# Patient Record
Sex: FEMALE | Race: AMERICAN INDIAN OR ALASKA NATIVE
[De-identification: names, ages, dates, MRNs, and addresses within clinical notes are randomized per-mention and may not be internally consistent; named-entity substitution may affect disease eponyms.]

---

## 2018-04-17 NOTE — CARD
--------------- APPROVED REPORT --------------





EKG Measurement

Heart [id number]

WV 182P62

[id number]

[id number]

[id number]



<Conclusion>

Normal sinus rhythm

Normal ECG

## 2018-04-17 NOTE — RAD
PROCEDURE:  Fluoroscopy up to 1 hr.



HISTORY:

LASER LITHOTRIPSY / STENT INSERTION  (LEFT)



COMPARISON:

None



TECHNIQUE:

Standard protocol for this study/examination.



FINDINGS:

 Total fluoroscopic time (continuous mode) utilized during the 

procedure 380.5 (seconds).  Total exam DLP: (mGy) 3.24 



IMPRESSION:

Less than 1 hr fluoroscopic time utilized during performance of the 

procedure.

## 2018-04-17 NOTE — CARD
--------------- APPROVED REPORT --------------





EKG Measurement

Heart [id number]

MD 192P51

[id number]

[id number]

[id number]



<Conclusion>

Normal sinus rhythm

Prolonged QT

Abnormal ECG

## 2018-04-17 NOTE — CP.PCM.CON
<Harmony Edwards - Last Filed: 04/17/18 19:40>





History of Present Illness





- History of Present Illness


History of Present Illness: 





PGY-2 House Doc for Dr. Eugene





Medical Consult: weakness, vomiting





Ms Rob, 58 F with PMHx HTN/HLD, R breast cancer in remission, comes in for 

elective surgery with Dr. Lawrence due to L hydronephrosis due to a very large 

calculus at mid left ureter with chronic obstruction. Today she underwent 

ureteroscopy with holmium laser lithrotripsy and cystoscopy with pigtail stent 

L. In PACU, pt ate muffin and drank 1 cup of coffee. She was seen ambulating in 

PACU and going to bathroom twice. RN found pt kneeling on the floor with 

clunched fists on her chest. Pt was assisted to the stretcher. Pt did not lose 

consciousness. Pt felt generalized weakness due to the pain and she had to 

gesture with clunched fists for the severe pain in L side of her back. Rapid 

response was called at 17:40 for clunched fists to r/o seizure 





ROS: (+) nausea/vomit (+) pain urination (+) frequency in urination, (+) L back 

pain 


Denies HA, dizziness, CP, SOB, abdominal pain, constipation, diarrhea, joint 

pain





PMH: HTN/HLD, anxiety, R breast cancer 2016 s/p R mastectomy/radiation/chemo, 

gout





PSH: port-a-cath; mastectomy





SH: Denies drink/smoke/drug





All: NKDA





Med: called [facility] pharm to confirm. See MAR





PMD: No


Outpatient oncologist: Dr Barone





Past Patient History





- Past Social History


Smoking Status: Never Smoked





- CARDIAC


Hx Pacemaker: No





- NEUROLOGICAL


Hx Paralysis: No





- HEMATOLOGICAL/ONCOLOGICAL


Hx Blood Transfusions: No





- MUSCULOSKELETAL/RHEUMATOLOGICAL


Hx Musculoskeletal Disorders: No





- PSYCHIATRIC


Hx Emotional Abuse: No


Hx Physical Abuse: No


Hx Substance Use: No





- SURGICAL HISTORY


Hx Surgeries: Yes





- ANESTHESIA


Hx Anesthesia Reactions: No


Hx Malignant Hyperthermia: No





Meds


Allergies/Adverse Reactions: 


 Allergies











Allergy/AdvReac Type Severity Reaction Status Date / Time


 


No Known Allergies Allergy   Verified 03/24/16 08:52














- Medications


Medications: 


 Current Medications





Alprazolam (Xanax)  0.25 mg PO BID [facility]


   PRN Reason: Protocol


   Stop: 04/24/18 18:31


Amlodipine Besylate (Norvasc)  5 mg PO QAM Counts include 234 beds at the [facility]


Levofloxacin/Dextrose (Levaquin 500mg)  500 mg in 100 mls @ 100 mls/hr IVPB 

DAILY [facility]


   PRN Reason: Protocol


   Stop: 04/23/18 10:01


Morphine Sulfate (Morphine)  1 mg IVP Q3 PRN


   PRN Reason: Pain, moderate (4-7)


Non-Formulary Medication (Simvastatin [Zocor])  40 mg PO HS Counts include 234 beds at the [facility]


Ondansetron HCl (Zofran Inj)  4 mg IVP Q6H PRN


   PRN Reason: Nausea/Vomiting


   Last Admin: 04/17/18 18:00 Dose:  4 mg


Oxycodone/Acetaminophen (Percocet 5/325 Mg Tab)  1 tab PO Q6H PRN


   PRN Reason: Bladder Spasm


   Stop: 04/20/18 14:18











Physical Exam





- Constitutional


Appears: No Acute Distress





- Head Exam


Head Exam: ATRAUMATIC, NORMAL INSPECTION, NORMOCEPHALIC





- Eye Exam


Eye Exam: EOMI, Normal appearance, PERRL.  absent: Scleral icterus


Pupil Exam: NORMAL ACCOMODATION





- ENT Exam


ENT Exam: Mucous Membranes Moist





- Neck Exam


Additional comments: 





supple





- Respiratory Exam


Respiratory Exam: Clear to Auscultation Bilateral, NORMAL BREATHING PATTERN.  

absent: Rales, Rhonchi, Wheezes





- Cardiovascular Exam


Cardiovascular Exam: REGULAR RHYTHM, +S1, +S2





- GI/Abdominal Exam


GI & Abdominal Exam: Normal Bowel Sounds, Soft.  absent: Distended, Firm, 

Guarding





- Extremities Exam


Extremities exam: Positive for: pedal pulses present.  Negative for: calf 

tenderness





- Back Exam


Additional comments: 





L back pain





- Neurological Exam


Neurological exam: Alert, CN II-XII Intact, Oriented x3


Additional comments: 





Normal speech


Move all extremities equally


No sensory deficit





- Psychiatric Exam


Psychiatric exam: Normal Affect, Normal Mood





- Skin


Skin Exam: Dry, Warm





Results





- Vital Signs


Recent Vital Signs: 


 Last Vital Signs











Temp  98.3 F   04/17/18 17:15


 


Pulse  108 H  04/17/18 17:15


 


Resp  18   04/17/18 17:15


 


BP  119/61   04/17/18 17:15


 


Pulse Ox  97   04/17/18 17:15














- Labs


Result Diagrams: 


 04/17/18 18:17





 04/17/18 18:17


Labs: 


 Laboratory Results - last 24 hr











  04/17/18





  17:44


 


POC Glucose (mg/dL)  136 H














Assessment & Plan





- Assessment and Plan (Free Text)


Plan: 





A:


Intractable nausea and vomiting likely side effect of anesthesia 


L back pain likely due to bladder spasm secondary to recent ureteroscopy with 

holmium laser lithrotripsy and cystoscopy with L pigtail stent


Dysuria likely UTI


Hx Anxiety


Hx HTN/HLD





P:





Zofran PRN; NS@100


EKG: NSR 99 with QTc 464


Pyridium x 1; Percocet 1q6 for bladder spasm, Morphine 1q3 PRN for moderate pain


Levofloxacin 500 IV daily x 5 days


continue home xanax BID


amlodipine 5mg AM


Lipitor 20 HS


SCD


protonix IV x 1, protonix PO AM


Clear liquid diet


Plan to advance diet tomorrow. If tolerating diet, plan to d/c tomorrow





s/r/d/w Dr. Eugene








<Marta Eugene - Last Filed: 04/18/18 16:53>





Meds





- Medications


Medications: 


 Current Medications





Alprazolam (Xanax)  0.25 mg PO BID Counts include 234 beds at the [facility]


   PRN Reason: Protocol


   Stop: 04/24/18 18:31


   Last Admin: 04/18/18 09:33 Dose:  Not Given


Amlodipine Besylate (Norvasc)  5 mg PO QAM Counts include 234 beds at the [facility]


   Last Admin: 04/18/18 09:29 Dose:  5 mg


Atorvastatin Calcium (Lipitor)  20 mg PO HS Counts include 234 beds at the [facility]


   Last Admin: 04/17/18 21:32 Dose:  20 mg


Enoxaparin Sodium (Lovenox)  40 mg SC DAILY Counts include 234 beds at the [facility]


   PRN Reason: Protocol


   Last Admin: 04/18/18 09:29 Dose:  40 mg


Levofloxacin/Dextrose (Levaquin 500mg)  500 mg in 100 mls @ 100 mls/hr IVPB 

DAILY [facility]


   PRN Reason: Protocol


   Stop: 04/23/18 10:01


   Last Admin: 04/18/18 09:29 Dose:  100 mls/hr


Sodium Chloride (Sodium Chloride 0.9%)  1,000 mls @ 100 mls/hr IV .Q10H [facility]


Morphine Sulfate (Morphine)  1 mg IVP Q3 PRN


   PRN Reason: Pain, moderate (4-7)


Ondansetron HCl (Zofran Inj)  4 mg IVP Q6H PRN


   PRN Reason: Nausea/Vomiting


   Last Admin: 04/17/18 18:00 Dose:  4 mg


Oxycodone/Acetaminophen (Percocet 5/325 Mg Tab)  1 tab PO Q6H PRN


   PRN Reason: Bladder Spasm


   Stop: 04/20/18 14:18


   Last Admin: 04/18/18 04:03 Dose:  1 tab


Pantoprazole Sodium (Protonix Ec Tab)  40 mg PO 0600 [facility]


   Last Admin: 04/18/18 06:04 Dose:  40 mg











Results





- Vital Signs


Recent Vital Signs: 


 Last Vital Signs











Temp  98.7 F   04/18/18 14:00


 


Pulse  112 H  04/18/18 14:00


 


Resp  18   04/18/18 14:00


 


BP  122/77   04/18/18 14:00


 


Pulse Ox  98   04/18/18 14:00














- Labs


Result Diagrams: 


 04/18/18 06:45





 04/18/18 06:45


Labs: 


 Laboratory Results - last 24 hr











  04/17/18 04/17/18 04/17/18





  17:44 18:17 18:17


 


WBC   12.1 H D 


 


RBC   4.12 


 


Hgb   11.9 L 


 


Hct   35.7 L 


 


MCV   86.7 


 


MCH   28.9 


 


MCHC   33.3 


 


RDW   13.7 


 


Plt Count   229 


 


MPV   9.8 


 


Gran %   


 


Lymph % (Auto)   


 


Mono % (Auto)   


 


Eos % (Auto)   


 


Baso % (Auto)   


 


Gran #   


 


Lymph # (Auto)   


 


Mono # (Auto)   


 


Eos # (Auto)   


 


Baso # (Auto)   


 


Sodium    140


 


Potassium    3.3 L


 


Chloride    101


 


Carbon Dioxide    27


 


Anion Gap    16


 


BUN    13


 


Creatinine    0.9


 


Est GFR ( Amer)    > 60


 


Est GFR (Non-Af Amer)    > 60


 


POC Glucose (mg/dL)  136 H  


 


Random Glucose    150 H


 


Calcium    8.7


 


Phosphorus    3.3


 


Magnesium    1.6 L


 


Total Bilirubin    0.4


 


AST    27


 


ALT    28


 


Alkaline Phosphatase    65


 


Total Protein    7.0


 


Albumin    4.1


 


Globulin    2.9


 


Albumin/Globulin Ratio    1.4














  04/18/18 04/18/18





  06:45 06:45


 


WBC  14.3 H 


 


RBC  4.21 


 


Hgb  12.0 


 


Hct  36.3 


 


MCV  86.2 


 


MCH  28.5 


 


MCHC  33.1 


 


RDW  13.9 


 


Plt Count  212 


 


MPV  9.1 


 


Gran %  88.8 H 


 


Lymph % (Auto)  6.8 L 


 


Mono % (Auto)  4.3 


 


Eos % (Auto)  0.0 L 


 


Baso % (Auto)  0.1 


 


Gran #  12.69 H 


 


Lymph # (Auto)  1.0 L 


 


Mono # (Auto)  0.6 


 


Eos # (Auto)  0.0 


 


Baso # (Auto)  0.01 


 


Sodium   138


 


Potassium   3.5 L


 


Chloride   99


 


Carbon Dioxide   27


 


Anion Gap   15


 


BUN   18


 


Creatinine   0.9


 


Est GFR ( Amer)   > 60


 


Est GFR (Non-Af Amer)   > 60


 


POC Glucose (mg/dL)  


 


Random Glucose   152 H


 


Calcium   8.6


 


Phosphorus  


 


Magnesium  


 


Total Bilirubin   0.6


 


AST   27


 


ALT   27


 


Alkaline Phosphatase   51


 


Total Protein   7.6


 


Albumin   4.5


 


Globulin   3.1


 


Albumin/Globulin Ratio   1.4














Attending/Attestation





- Attestation


I have personally seen and examined this patient.: Yes


I have fully participated in the care of the patient.: Yes


I have reviewed all pertinent clinical information: Yes


Notes (Text): 





04/18/18 16:47








Medical record note made by the resident after discussion with my direction and 

input after the patient was personally seen and examined by me. I have reviewed 

the chart and agree that the record accurately reflects by personal performance 

of the history, physical exam, data review, and medical decision-making, in the 

course for the patient. I have also personally directed the plan of care.





58  yrs old female  with PMHx HTN/HLD, R breast cancer in remission,

Nephrolithiasis  comes in for elective surgery with Dr. Lawrence due to L 

hydronephrosis due to a very large calculus at mid left ureter with chronic 

obstruction.Patient is SP  ureteroscopy with holmium laser lithrotripsy and 

cystoscopy with pigtail stent L. In PACU, pt ate muffin and drank 1 cup of 

coffee. She was seen ambulating in PACU and going to bathroom twice. RN found 

pt kneeling on the floor with clunched fists on her.Patient did not has any 

fall.There is H/O lose of consiousness.There is no h/o incontinence of urine or 

stool or tongue bite.Patient was back to her base line in 1-2 minutes.She is 

aware of all the events and tols that she was clnching as she was having 

pain.Patient Neuro examination is normal.There is no evidence of 

seizure.Patient is admitted for observation by Urology.


Continue supportive treatment for Nausea and vomiting.


Continue home anti hypertensive medication.





We will follow up patient with Urology service

## 2018-04-18 NOTE — RAD
HISTORY:

Urolithiasis.



COMPARISON:

Correlation made with prior PET-CT scan 04/03/2018



FINDINGS:

Interval placement left ureteral stent. Elliptical shaped 

calcification measuring approximately 16 mm x 11 mm adjacent to the 

superolateral border of the proximal pigtail - coil component of the 

ureteral stent. . .  This calculus likely represented calculus that 

was located in the proximal left ureter (seen on prior PET-CT scan) 

which has been pushed back into the left renal collecting system 

during placement of the ureteral stent. 



BOWEL:

Nonspecific bowel gas pattern.



BONES:

Normal.



OTHER FINDINGS:

None.



IMPRESSION:

In situ left ureteral stent.  Previously noted large calculus that 

was seen in the proximal left ureter presumably has been pushed back 

up into the collecting system as above.

## 2018-04-18 NOTE — CP.PCM.PN
<Tyree Gregory - Last Filed: 04/18/18 13:42>





Subjective





- Date & Time of Evaluation


Date of Evaluation: 04/18/18


Time of Evaluation: 13:42





- Subjective


Subjective: 





Patient seen and evaluated overnight. Patient overnight required straight cath 

for evacuation of urine. Patient voiding appropriately at this time. No 

complaints otherwise. 





Objective





- Vital Signs/Intake and Output


Vital Signs (last 24 hours): 


 











Temp Pulse Resp BP Pulse Ox


 


 98.8 F   107 H  18   131/72   97 


 


 04/18/18 09:06  04/18/18 09:06  04/18/18 09:06  04/18/18 09:29  04/18/18 09:06








Intake and Output: 


 











 04/18/18 04/18/18





 06:59 18:59


 


Intake Total 600 


 


Output Total 1300 


 


Balance -700 














- Medications


Medications: 


 Current Medications





Alprazolam (Xanax)  0.25 mg PO BID [facility]


   PRN Reason: Protocol


   Stop: 04/24/18 18:31


   Last Admin: 04/18/18 09:33 Dose:  Not Given


Amlodipine Besylate (Norvasc)  5 mg PO QAM [facility]


   Last Admin: 04/18/18 09:29 Dose:  5 mg


Atorvastatin Calcium (Lipitor)  20 mg PO HS [facility]


   Last Admin: 04/17/18 21:32 Dose:  20 mg


Enoxaparin Sodium (Lovenox)  40 mg SC DAILY [facility]


   PRN Reason: Protocol


   Last Admin: 04/18/18 09:29 Dose:  40 mg


Levofloxacin/Dextrose (Levaquin 500mg)  500 mg in 100 mls @ 100 mls/hr IVPB 

DAILY [facility]


   PRN Reason: Protocol


   Stop: 04/23/18 10:01


   Last Admin: 04/18/18 09:29 Dose:  100 mls/hr


Sodium Chloride (Sodium Chloride 0.9%)  1,000 mls @ 100 mls/hr IV .Q10H [facility]


Morphine Sulfate (Morphine)  1 mg IVP Q3 PRN


   PRN Reason: Pain, moderate (4-7)


Ondansetron HCl (Zofran Inj)  4 mg IVP Q6H PRN


   PRN Reason: Nausea/Vomiting


   Last Admin: 04/17/18 18:00 Dose:  4 mg


Oxycodone/Acetaminophen (Percocet 5/325 Mg Tab)  1 tab PO Q6H PRN


   PRN Reason: Bladder Spasm


   Stop: 04/20/18 14:18


   Last Admin: 04/18/18 04:03 Dose:  1 tab


Pantoprazole Sodium (Protonix Ec Tab)  40 mg PO 0600 [facility]


   Last Admin: 04/18/18 06:04 Dose:  40 mg











- Labs


Labs: 


 





 04/18/18 06:45 





 04/18/18 06:45 











- Head Exam


Head Exam: ATRAUMATIC, NORMAL INSPECTION, NORMOCEPHALIC





- Eye Exam


Eye Exam: EOMI, PERRL





- Respiratory Exam


Respiratory Exam: Clear to Ausculation Bilateral, NORMAL BREATHING PATTERN.  

absent: Wheezes





- Cardiovascular Exam


Cardiovascular Exam: REGULAR RHYTHM, +S1, +S2





- GI/Abdominal Exam


GI & Abdominal Exam: Soft, Tenderness (mild left lower quadrant ), Normal Bowel 

Sounds





- Back Exam


Back Exam: NORMAL INSPECTION.  absent: paraspinal tenderness





- Neurological Exam


Neurological Exam: Alert, Awake, Oriented x3





- Psychiatric Exam


Psychiatric exam: Normal Affect, Normal Mood





- Skin


Skin Exam: Dry, Warm





Assessment and Plan





- Assessment and Plan (Free Text)


Assessment: 





Patient is a 58 year old female who was admitted for intractable nausea and 

vomiting likely side effect of anesthesia L back pain likely due to bladder 

spasm secondary to recent ureteroscopy with holmium laser lithrotripsy and 

cystoscopy with L pigtail stent. Patient admitted by Dr. Lawrence. 


Plan: 





Bladder spasm s/p urethral stenting with Dr. Lawrence


- Levofloxacin


- Pyridium


- Percocet 1q6H


- Monitor clinically 





HLD


- Lipitor 20mg





HTN


- Monitor clinically


- Stable


- Amlodipine





DVT: Lovenox


GI: protonix








Dispo: Patient is medically cleared for outpatient follow up, please reach out 

for further questions





Case and plan discussed with attending





<Marta Eugene - Last Filed: 04/18/18 16:53>





Objective





- Vital Signs/Intake and Output


Vital Signs (last 24 hours): 


 











Temp Pulse Resp BP Pulse Ox


 


 98.7 F   112 H  18   122/77   98 


 


 04/18/18 14:00  04/18/18 14:00  04/18/18 14:00  04/18/18 14:00  04/18/18 14:00








Intake and Output: 


 











 04/18/18 04/18/18





 06:59 18:59


 


Intake Total 600 8840


 


Output Total 1300 300


 


Balance -700 8540














- Medications


Medications: 


 Current Medications





Alprazolam (Xanax)  0.25 mg PO BID [facility]


   PRN Reason: Protocol


   Stop: 04/24/18 18:31


   Last Admin: 04/18/18 09:33 Dose:  Not Given


Amlodipine Besylate (Norvasc)  5 mg PO QAM [facility]


   Last Admin: 04/18/18 09:29 Dose:  5 mg


Atorvastatin Calcium (Lipitor)  20 mg PO HS [facility]


   Last Admin: 04/17/18 21:32 Dose:  20 mg


Enoxaparin Sodium (Lovenox)  40 mg SC DAILY [facility]


   PRN Reason: Protocol


   Last Admin: 04/18/18 09:29 Dose:  40 mg


Levofloxacin/Dextrose (Levaquin 500mg)  500 mg in 100 mls @ 100 mls/hr IVPB 

DAILY [facility]


   PRN Reason: Protocol


   Stop: 04/23/18 10:01


   Last Admin: 04/18/18 09:29 Dose:  100 mls/hr


Sodium Chloride (Sodium Chloride 0.9%)  1,000 mls @ 100 mls/hr IV .Q10H [facility]


Morphine Sulfate (Morphine)  1 mg IVP Q3 PRN


   PRN Reason: Pain, moderate (4-7)


Ondansetron HCl (Zofran Inj)  4 mg IVP Q6H PRN


   PRN Reason: Nausea/Vomiting


   Last Admin: 04/17/18 18:00 Dose:  4 mg


Oxycodone/Acetaminophen (Percocet 5/325 Mg Tab)  1 tab PO Q6H PRN


   PRN Reason: Bladder Spasm


   Stop: 04/20/18 14:18


   Last Admin: 04/18/18 04:03 Dose:  1 tab


Pantoprazole Sodium (Protonix Ec Tab)  40 mg PO 0600 [facility]


   Last Admin: 04/18/18 06:04 Dose:  40 mg











- Labs


Labs: 


 





 04/18/18 06:45 





 04/18/18 06:45 











Attending/Attestation





- Attestation


I have personally seen and examined this patient.: Yes


I have fully participated in the care of the patient.: Yes


I have reviewed all pertinent clinical information, including history, physical 

exam and plan: Yes


Notes (Text): 





04/18/18 16:53





Medical record note made by the resident after discussion with my direction and 

input after the patient was personally seen and examined by me. I have reviewed 

the chart and agree that the record accurately reflects by personal performance 

of the history, physical exam, data review, and medical decision-making, in the 

course for the patient. I have also personally directed the plan of care.

## 2018-04-18 NOTE — CP.PCM.CON
History of Present Illness





- History of Present Illness


History of Present Illness: 


PGY-2 consult note for Dr. Barone





58 F with PMH of HTN, hyperlipidemia, Right breast cancer in remission, comes 

in for elective surgery with Dr. Lawrence due to L hydronephrosis due to a very 

large calculus at mid left ureter with chronic obstruction. She underwent 

ureteroscopy with holmium laser lithrotripsy and cystoscopy with pigtail stent. 

In PACU, She was seen ambulating in PACU and going to bathroom twice. RN found 

pt kneeling on the floor with clutched fists on her chest. Pt was assisted to 

the stretcher. Pt felt generalized weakness due to the pain and she had to 

gesture with clutched fists for the severe pain in L side of her back. Rapid 

response was called. Patients states that she was having pain 10/10 greater on 

the left side. She states that she felt that she had to urinate but could not. 

Overnight patient had garner placed and drained about 1500cc. This morning 

patient states that ernesto pain is much better and she is able to make urine 

however she reports hematuria. She denies fever, chill, chest pain, sob, n/v 

abd pain, headache, dizziness.





PMH: HTN, hyperlipidemia, anxiety, R breast cancer 2016 s/p R mastectomy/

radiation/chemo, gout


PSH: port-a-cath; mastectomy


Social History : Denies alcohol use, smoke, illict drug use


Allergy: NKDA














Review of Systems





- Review of Systems


All systems: reviewed and no additional remarkable complaints except





Past Patient History





- Past Social History


Smoking Status: Never Smoked





- CARDIAC


Hx Cardiac Disorders: No





- PULMONARY


Hx Respiratory Disorders: No





- NEUROLOGICAL


Hx Neurological Disorder: No





- HEENT


Hx HEENT Problems: No





- RENAL


Hx Kidney Stones: Yes





- ENDOCRINE/METABOLIC


Hx Endocrine Disorders: No





- HEMATOLOGICAL/ONCOLOGICAL


Hx Blood Disorders: No





- INTEGUMENTARY


Hx Dermatological Problems: No





- MUSCULOSKELETAL/RHEUMATOLOGICAL


Hx Musculoskeletal Disorders: No


Hx Falls: No





- GASTROINTESTINAL


Hx Gastrointestinal Disorders: No





- GENITOURINARY/GYNECOLOGICAL


Hx Genitourinary Disorders: Yes


Hx Urinary Tract Infection: Yes





- PSYCHIATRIC


Hx Psychophysiologic Disorder: No


Hx Substance Use: No





- SURGICAL HISTORY


Hx Surgeries: Yes


Hx Mastectomy: Yes





- ANESTHESIA


Hx Anesthesia Reactions: No


Hx Malignant Hyperthermia: No





Meds


Allergies/Adverse Reactions: 


 Allergies











Allergy/AdvReac Type Severity Reaction Status Date / Time


 


No Known Allergies Allergy   Verified 03/24/16 08:52














- Medications


Medications: 


 Current Medications





Alprazolam (Xanax)  0.25 mg PO BID [facility]


   PRN Reason: Protocol


   Stop: 04/24/18 18:31


   Last Admin: 04/18/18 09:33 Dose:  Not Given


Amlodipine Besylate (Norvasc)  5 mg PO QAM [facility]


   Last Admin: 04/18/18 09:29 Dose:  5 mg


Atorvastatin Calcium (Lipitor)  20 mg PO HS [facility]


   Last Admin: 04/17/18 21:32 Dose:  20 mg


Enoxaparin Sodium (Lovenox)  40 mg SC DAILY [facility]


   PRN Reason: Protocol


   Last Admin: 04/18/18 09:29 Dose:  40 mg


Levofloxacin/Dextrose (Levaquin 500mg)  500 mg in 100 mls @ 100 mls/hr IVPB 

DAILY [facility]


   PRN Reason: Protocol


   Stop: 04/23/18 10:01


   Last Admin: 04/18/18 09:29 Dose:  100 mls/hr


Sodium Chloride (Sodium Chloride 0.9%)  1,000 mls @ 100 mls/hr IV .Q10H [facility]


Morphine Sulfate (Morphine)  1 mg IVP Q3 PRN


   PRN Reason: Pain, moderate (4-7)


Ondansetron HCl (Zofran Inj)  4 mg IVP Q6H PRN


   PRN Reason: Nausea/Vomiting


   Last Admin: 04/17/18 18:00 Dose:  4 mg


Oxycodone/Acetaminophen (Percocet 5/325 Mg Tab)  1 tab PO Q6H PRN


   PRN Reason: Bladder Spasm


   Stop: 04/20/18 14:18


   Last Admin: 04/18/18 04:03 Dose:  1 tab


Pantoprazole Sodium (Protonix Ec Tab)  40 mg PO 0600 [facility]


   Last Admin: 04/18/18 06:04 Dose:  40 mg











Physical Exam





- Constitutional


Appears: No Acute Distress





- Head Exam


Head Exam: ATRAUMATIC, NORMOCEPHALIC





- Eye Exam


Eye Exam: Normal appearance





- ENT Exam


ENT Exam: Mucous Membranes Moist





- Respiratory Exam


Respiratory Exam: Clear to Auscultation Bilateral, NORMAL BREATHING PATTERN.  

absent: Rhonchi, Wheezes, Respiratory Distress





- Cardiovascular Exam


Cardiovascular Exam: REGULAR RHYTHM, +S1, +S2.  absent: Bradycardia, Tachycardia

, Diastolic murmur, Systolic Murmur





- GI/Abdominal Exam


GI & Abdominal Exam: Normal Bowel Sounds, Soft, Tenderness (L>R).  absent: 

Distended, Firm





- Extremities Exam


Extremities exam: Positive for: normal inspection.  Negative for: pedal edema, 

tenderness





- Neurological Exam


Neurological exam: Alert, Oriented x3





- Psychiatric Exam


Psychiatric exam: Normal Affect, Normal Mood





- Skin


Skin Exam: Dry, Intact, Normal Color, Warm





Results





- Vital Signs


Recent Vital Signs: 


 Last Vital Signs











Temp  98.8 F   04/18/18 09:06


 


Pulse  107 H  04/18/18 09:06


 


Resp  18   04/18/18 09:06


 


BP  131/72   04/18/18 09:29


 


Pulse Ox  97   04/18/18 09:06














- Labs


Result Diagrams: 


 04/18/18 06:45





 04/18/18 06:45


Labs: 


 Laboratory Results - last 24 hr











  04/17/18 04/17/18 04/17/18





  17:44 18:17 18:17


 


WBC   12.1 H D 


 


RBC   4.12 


 


Hgb   11.9 L 


 


Hct   35.7 L 


 


MCV   86.7 


 


MCH   28.9 


 


MCHC   33.3 


 


RDW   13.7 


 


Plt Count   229 


 


MPV   9.8 


 


Gran %   


 


Lymph % (Auto)   


 


Mono % (Auto)   


 


Eos % (Auto)   


 


Baso % (Auto)   


 


Gran #   


 


Lymph # (Auto)   


 


Mono # (Auto)   


 


Eos # (Auto)   


 


Baso # (Auto)   


 


Sodium    140


 


Potassium    3.3 L


 


Chloride    101


 


Carbon Dioxide    27


 


Anion Gap    16


 


BUN    13


 


Creatinine    0.9


 


Est GFR ( Amer)    > 60


 


Est GFR (Non-Af Amer)    > 60


 


POC Glucose (mg/dL)  136 H  


 


Random Glucose    150 H


 


Calcium    8.7


 


Phosphorus    3.3


 


Magnesium    1.6 L


 


Total Bilirubin    0.4


 


AST    27


 


ALT    28


 


Alkaline Phosphatase    65


 


Total Protein    7.0


 


Albumin    4.1


 


Globulin    2.9


 


Albumin/Globulin Ratio    1.4














  04/18/18 04/18/18





  06:45 06:45


 


WBC  14.3 H 


 


RBC  4.21 


 


Hgb  12.0 


 


Hct  36.3 


 


MCV  86.2 


 


MCH  28.5 


 


MCHC  33.1 


 


RDW  13.9 


 


Plt Count  212 


 


MPV  9.1 


 


Gran %  88.8 H 


 


Lymph % (Auto)  6.8 L 


 


Mono % (Auto)  4.3 


 


Eos % (Auto)  0.0 L 


 


Baso % (Auto)  0.1 


 


Gran #  12.69 H 


 


Lymph # (Auto)  1.0 L 


 


Mono # (Auto)  0.6 


 


Eos # (Auto)  0.0 


 


Baso # (Auto)  0.01 


 


Sodium   138


 


Potassium   3.5 L


 


Chloride   99


 


Carbon Dioxide   27


 


Anion Gap   15


 


BUN   18


 


Creatinine   0.9


 


Est GFR ( Amer)   > 60


 


Est GFR (Non-Af Amer)   > 60


 


POC Glucose (mg/dL)  


 


Random Glucose   152 H


 


Calcium   8.6


 


Phosphorus  


 


Magnesium  


 


Total Bilirubin   0.6


 


AST   27


 


ALT   27


 


Alkaline Phosphatase   51


 


Total Protein   7.6


 


Albumin   4.5


 


Globulin   3.1


 


Albumin/Globulin Ratio   1.4














Assessment & Plan





- Assessment and Plan (Free Text)


Assessment: 


58 F with PMH of HTN, hyperlipidemia, Right breast cancer in remission, comes 

in for elective surgery admitted s/p RRT, patient presents with  left sided 

back pain and urinary retention, currently with hematuria. 


Dysuria with hematuria


Anxiety


HTN





Plan: 





hematuria and pain possibly secondary to procedure


abd xray showed insitu Left ureteral stent


NS@100


EKG: NSR 99 with QTc 464


continue pain management


continue antibiotics per primary 


patient will need real scan once improved to evaluate kidney function


patient will also need outpatient MRI fo dorsal spine 


continue home medications xanax, amlodipine,Lipitor 





case reviewed and discussed with Dr. Barone

## 2018-04-19 NOTE — PCM.URO
Urology Progress Note





- Objective


Lab Studies: Reviewed


Lab Results Last 24 Hours: 


 Laboratory Results - last 24 hr











  04/19/18 04/19/18 04/19/18





  07:00 07:00 10:20


 


WBC  9.7  D  


 


RBC  3.53  


 


Hgb  10.1 L  


 


Hct  30.4 L  


 


MCV  86.1  


 


MCH  28.6  


 


MCHC  33.2  


 


RDW  13.9  


 


Plt Count  174  


 


MPV  9.0  


 


Gran %  76.9 H  


 


Lymph % (Auto)  14.5 L  


 


Mono % (Auto)  8.4 H  


 


Eos % (Auto)  0.0 L  


 


Baso % (Auto)  0.2  


 


Gran #  7.46 H  


 


Lymph # (Auto)  1.4  


 


Mono # (Auto)  0.8 H  


 


Eos # (Auto)  0.0  


 


Baso # (Auto)  0.02  


 


Sodium   141 


 


Potassium   3.3 L 


 


Chloride   104 


 


Carbon Dioxide   27 


 


Anion Gap   13 


 


BUN   9 


 


Creatinine   0.9 


 


Est GFR ( Amer)   > 60 


 


Est GFR (Non-Af Amer)   > 60 


 


Random Glucose   112 H 


 


Calcium   8.7 


 


Magnesium   1.8 


 


Total Bilirubin   0.5 


 


AST   24 


 


ALT   26 


 


Alkaline Phosphatase   44 


 


Total Protein   6.4 


 


Albumin   3.6 


 


Globulin   2.8 


 


Albumin/Globulin Ratio   1.3 


 


Urine Color    Red


 


Urine Appearance    Turbid


 


Urine pH    6.5


 


Ur Specific Gravity    1.010


 


Urine Protein    100 H


 


Urine Glucose (UA)    Negative


 


Urine Ketones    15 H


 


Urine Blood    Large H


 


Urine Nitrate    Negative


 


Urine Bilirubin    Negative


 


Urine Urobilinogen    0.2


 


Ur Leukocyte Esterase    Moderate H


 


Urine RBC    Tntc


 


Urine WBC    15 - 20


 


Urine Bacteria    Trace











Intake & Output: 


 Intake & Output











 04/18/18 04/19/18 04/19/18





 18:59 06:59 18:59


 


Intake Total 8840 720 780


 


Output Total 300 975 900


 


Balance 8540 -255 -120


 


Weight 126 lb  


 


Intake:   


 


  Oral 8840 720 780


 


Output:   


 


  Urine 300 975 900


 


    Urethral (Andres) 300 975 900


 


Other:   


 


  # Voids   


 


    Urethral (Andres) 5  


 


  # Bowel Movements 0 0 0











Vital Signs: 


 Vital Signs - 24 hr











  04/19/18 04/19/18 04/19/18





  06:00 14:00 15:00


 


Temperature 99.2 F 102.8 F H 99.1 F


 


Pulse Rate 106 H 137 H 


 


Respiratory 18 18 





Rate   


 


Blood Pressure 118/61 121/65 


 


O2 Sat by Pulse 96 98 





Oximetry

## 2018-04-19 NOTE — CARD
--------------- APPROVED REPORT --------------





EKG Measurement

Heart [id number]

ND [id number]

[id number]

[id number]

[id number]



<Conclusion>

Sinus tachycardia

Nonspecific T wave abnormality

Abnormal ECG

## 2018-04-19 NOTE — CP.PCM.CON
History of Present Illness





- History of Present Illness


History of Present Illness: 


Infectious Disease Consultation:


April 19, 2019





59 yo [place] female with PMHx of HTN, hyperlipidemia, right breast cancer 

requiring mastectomy and radiation therapy with one episode of recurrence also 

treated presented for large calculus at mid left ureter with chronic 

obstruction.  The patient was taken to OR by Dr. Lawrence for laser therapy to 

remove and break stone.  Majority of the stone was removed.  A smaller piece in 

the left kidney at this time.  Had urinary retention as well.  Developed fevers 

up to 102.8 F today.





Overall she is feeling better but still with tenderness of the left mid back 

and flank regions.





PMHx:


HTN, hyperlipidemia, right breast cancer requiring mastectomy and radiation 

therapy





PSHx:


Right breast mastectomy





Allergies:


NKDA





Social Hx:


No tobacco, EtOH, or illicit drug use





Active Medications





Acetaminophen (Tylenol 325mg Tab)  650 mg PO Q6H PRN


   PRN Reason: Fever >100.4 F


   Last Admin: 04/19/18 14:00 Dose:  650 mg


Alprazolam (Xanax)  0.25 mg PO BID [facility]


   PRN Reason: Protocol


   Stop: 04/24/18 18:31


   Last Admin: 04/19/18 10:02 Dose:  0.25 mg


Amlodipine Besylate (Norvasc)  5 mg PO QAM [facility]


   Last Admin: 04/19/18 10:02 Dose:  5 mg


Atorvastatin Calcium (Lipitor)  20 mg PO HS [facility]


   Last Admin: 04/18/18 21:27 Dose:  20 mg


Hyoscyamine (Levsin)  0.125 mg PO Q4H PRN


   PRN Reason: GI distress


Sodium Chloride (Sodium Chloride 0.9%)  1,000 mls @ 100 mls/hr IV .Q10H [facility]


   Last Admin: 04/19/18 10:03 Dose:  100 mls/hr


Piperacillin Sod/Tazobactam Sod (Zosyn 4.5 Gm In Ns 100ml)  4.5 gm in 100 mls @ 

200 mls/hr IVPB Q6 [facility]


   PRN Reason: Protocol


   Stop: 04/20/18 00:29


Magnesium Citrate (Citrate Of Mag)  300 ml PO ONCE ONE


   Stop: 04/19/18 18:06


Morphine Sulfate (Morphine)  1 mg IVP Q3 PRN


   PRN Reason: Pain, moderate (4-7)


   Last Admin: 04/18/18 21:27 Dose:  1 mg


Ondansetron HCl (Zofran Inj)  4 mg IVP Q6H PRN


   PRN Reason: Nausea/Vomiting


   Last Admin: 04/17/18 18:00 Dose:  4 mg


Oxycodone/Acetaminophen (Percocet 5/325 Mg Tab)  1 tab PO Q6H PRN


   PRN Reason: Bladder Spasm


   Stop: 04/20/18 14:18


   Last Admin: 04/19/18 04:03 Dose:  1 tab


Pantoprazole Sodium (Protonix Ec Tab)  40 mg PO 0600 [facility]


   Last Admin: 04/19/18 10:04 Dose:  Not Given





Family Hx:


none given





ROS:


Fevers, chills, left flank pain.


No SOB, headaches, dizziness, chest pain, melena, hematuria, hematemesis, 

hematochezia, depression, anxiety, depression.





Past Patient History





- Past Social History


Smoking Status: Never Smoked





- CARDIAC


Hx Cardiac Disorders: No





- PULMONARY


Hx Respiratory Disorders: No





- NEUROLOGICAL


Hx Neurological Disorder: No





- HEENT


Hx HEENT Problems: No





- RENAL


Hx Kidney Stones: Yes





- ENDOCRINE/METABOLIC


Hx Endocrine Disorders: No





- HEMATOLOGICAL/ONCOLOGICAL


Hx Blood Disorders: No





- INTEGUMENTARY


Hx Dermatological Problems: No





- MUSCULOSKELETAL/RHEUMATOLOGICAL


Hx Musculoskeletal Disorders: No


Hx Falls: No





- GASTROINTESTINAL


Hx Gastrointestinal Disorders: No





- GENITOURINARY/GYNECOLOGICAL


Hx Genitourinary Disorders: Yes


Hx Urinary Tract Infection: Yes





- PSYCHIATRIC


Hx Psychophysiologic Disorder: No


Hx Substance Use: No





- SURGICAL HISTORY


Hx Surgeries: Yes


Hx Mastectomy: Yes





- ANESTHESIA


Hx Anesthesia Reactions: No


Hx Malignant Hyperthermia: No





Meds


Allergies/Adverse Reactions: 


 Allergies











Allergy/AdvReac Type Severity Reaction Status Date / Time


 


No Known Allergies Allergy   Verified 03/24/16 08:52














- Medications


Medications: 


 Current Medications





Acetaminophen (Tylenol 325mg Tab)  650 mg PO Q6H PRN


   PRN Reason: Fever >100.4 F


   Last Admin: 04/19/18 14:00 Dose:  650 mg


Alprazolam (Xanax)  0.25 mg PO BID [facility]


   PRN Reason: Protocol


   Stop: 04/24/18 18:31


   Last Admin: 04/19/18 10:02 Dose:  0.25 mg


Amlodipine Besylate (Norvasc)  5 mg PO QAM [facility]


   Last Admin: 04/19/18 10:02 Dose:  5 mg


Atorvastatin Calcium (Lipitor)  20 mg PO HS [facility]


   Last Admin: 04/18/18 21:27 Dose:  20 mg


Hyoscyamine (Levsin)  0.125 mg PO Q4H PRN


   PRN Reason: GI distress


Sodium Chloride (Sodium Chloride 0.9%)  1,000 mls @ 100 mls/hr IV .Q10H [facility]


   Last Admin: 04/19/18 10:03 Dose:  100 mls/hr


Piperacillin Sod/Tazobactam Sod (Zosyn 4.5 Gm In Ns 100ml)  4.5 gm in 100 mls @ 

200 mls/hr IVPB Q6 [facility]


   PRN Reason: Protocol


   Stop: 04/20/18 00:29


Magnesium Citrate (Citrate Of Mag)  300 ml PO ONCE ONE


   Stop: 04/19/18 18:06


Morphine Sulfate (Morphine)  1 mg IVP Q3 PRN


   PRN Reason: Pain, moderate (4-7)


   Last Admin: 04/18/18 21:27 Dose:  1 mg


Ondansetron HCl (Zofran Inj)  4 mg IVP Q6H PRN


   PRN Reason: Nausea/Vomiting


   Last Admin: 04/17/18 18:00 Dose:  4 mg


Oxycodone/Acetaminophen (Percocet 5/325 Mg Tab)  1 tab PO Q6H PRN


   PRN Reason: Bladder Spasm


   Stop: 04/20/18 14:18


   Last Admin: 04/19/18 04:03 Dose:  1 tab


Pantoprazole Sodium (Protonix Ec Tab)  40 mg PO 0600 [facility]


   Last Admin: 04/19/18 10:04 Dose:  Not Given











Physical Exam





- Constitutional


Appears: Non-toxic, No Acute Distress, Chronically Ill





- Head Exam


Head Exam: ATRAUMATIC, NORMOCEPHALIC





- Eye Exam


Eye Exam: EOMI, PERRL


Pupil Exam: NORMAL ACCOMODATION, PERRL





- ENT Exam


ENT Exam: Mucous Membranes Moist, Normal External Ear Exam, TM's Normal 

Bilaterally





- Neck Exam


Neck exam: Positive for: Full Rom, Normal Inspection





- Respiratory Exam


Respiratory Exam: Clear to Auscultation Bilateral, NORMAL BREATHING PATTERN.  

absent: Rales, Rhonchi, Wheezes





- Cardiovascular Exam


Cardiovascular Exam: REGULAR RHYTHM, RRR, +S1, +S2





- GI/Abdominal Exam


Additional comments: 


left flank pain.





- Extremities Exam


Extremities exam: Positive for: full ROM, normal inspection





- Neurological Exam


Neurological exam: Alert, CN II-XII Intact, Normal Gait, Oriented x3





- Psychiatric Exam


Psychiatric exam: Normal Affect, Normal Mood





- Skin


Skin Exam: Intact, Normal Color





Results





- Vital Signs


Recent Vital Signs: 


 Last Vital Signs











Temp  99.1 F   04/19/18 15:00


 


Pulse  137 H  04/19/18 14:00


 


Resp  18   04/19/18 14:00


 


BP  121/65   04/19/18 14:00


 


Pulse Ox  98   04/19/18 14:00














- Labs


Result Diagrams: 


 04/19/18 07:00





 04/19/18 07:00


Labs: 


 Laboratory Results - last 24 hr











  04/19/18 04/19/18 04/19/18





  07:00 07:00 10:20


 


WBC  9.7  D  


 


RBC  3.53  


 


Hgb  10.1 L  


 


Hct  30.4 L  


 


MCV  86.1  


 


MCH  28.6  


 


MCHC  33.2  


 


RDW  13.9  


 


Plt Count  174  


 


MPV  9.0  


 


Gran %  76.9 H  


 


Lymph % (Auto)  14.5 L  


 


Mono % (Auto)  8.4 H  


 


Eos % (Auto)  0.0 L  


 


Baso % (Auto)  0.2  


 


Gran #  7.46 H  


 


Lymph # (Auto)  1.4  


 


Mono # (Auto)  0.8 H  


 


Eos # (Auto)  0.0  


 


Baso # (Auto)  0.02  


 


Sodium   141 


 


Potassium   3.3 L 


 


Chloride   104 


 


Carbon Dioxide   27 


 


Anion Gap   13 


 


BUN   9 


 


Creatinine   0.9 


 


Est GFR ( Amer)   > 60 


 


Est GFR (Non-Af Amer)   > 60 


 


Random Glucose   112 H 


 


Lactic Acid   


 


Calcium   8.7 


 


Magnesium   1.8 


 


Total Bilirubin   0.5 


 


AST   24 


 


ALT   26 


 


Alkaline Phosphatase   44 


 


Total Protein   6.4 


 


Albumin   3.6 


 


Globulin   2.8 


 


Albumin/Globulin Ratio   1.3 


 


Urine Color    Red


 


Urine Appearance    Turbid


 


Urine pH    6.5


 


Ur Specific Gravity    1.010


 


Urine Protein    100 H


 


Urine Glucose (UA)    Negative


 


Urine Ketones    15 H


 


Urine Blood    Large H


 


Urine Nitrate    Negative


 


Urine Bilirubin    Negative


 


Urine Urobilinogen    0.2


 


Ur Leukocyte Esterase    Moderate H


 


Urine RBC    Tntc


 


Urine WBC    15 - 20


 


Urine Bacteria    Trace














  04/19/18





  16:20


 


WBC 


 


RBC 


 


Hgb 


 


Hct 


 


MCV 


 


MCH 


 


MCHC 


 


RDW 


 


Plt Count 


 


MPV 


 


Gran % 


 


Lymph % (Auto) 


 


Mono % (Auto) 


 


Eos % (Auto) 


 


Baso % (Auto) 


 


Gran # 


 


Lymph # (Auto) 


 


Mono # (Auto) 


 


Eos # (Auto) 


 


Baso # (Auto) 


 


Sodium 


 


Potassium 


 


Chloride 


 


Carbon Dioxide 


 


Anion Gap 


 


BUN 


 


Creatinine 


 


Est GFR ( Amer) 


 


Est GFR (Non-Af Amer) 


 


Random Glucose 


 


Lactic Acid  0.7


 


Calcium 


 


Magnesium 


 


Total Bilirubin 


 


AST 


 


ALT 


 


Alkaline Phosphatase 


 


Total Protein 


 


Albumin 


 


Globulin 


 


Albumin/Globulin Ratio 


 


Urine Color 


 


Urine Appearance 


 


Urine pH 


 


Ur Specific Gravity 


 


Urine Protein 


 


Urine Glucose (UA) 


 


Urine Ketones 


 


Urine Blood 


 


Urine Nitrate 


 


Urine Bilirubin 


 


Urine Urobilinogen 


 


Ur Leukocyte Esterase 


 


Urine RBC 


 


Urine WBC 


 


Urine Bacteria 














Assessment & Plan





- Assessment and Plan (Free Text)


Assessment: 


59 yo [place] female with Breast Cancer treated with mastectomy and radiation 

therapy.  Laser therapy for very large urethral stone.  Patient with fevers up 

to 102.8 F in the past 24 hours.  Start on Cefepime for antibiotic coverage.  

Pan cultures of blood and urine.  Supportive care.





Patient with good renal function and no leukocytosis at this time.





Case discussed with Dr. Lawrence and Dr. Eugene.





Thank you for allowing me to participate in the care of the patient, we will 

follow with you.

## 2018-04-19 NOTE — CP.PCM.PN
<Tyree Gregory - Last Filed: 04/19/18 14:11>





Subjective





- Date & Time of Evaluation


Date of Evaluation: 04/19/18


Time of Evaluation: 07:45





- Subjective


Subjective: 





Patient seen and evaluated this AM. Patient was scheduled for dc yesterday 

evening. Patient continued to have difficulty with urinating. Straight cath 

preformed with evacuation of ~300cc of hematuria. Patient remained in hospital. 

Patient today reports with pain medication she is able to void without 

difficulty. Patient denies feeling febrile, abdominal discomfort, shortness of 

breath, chest pain, nausea, vomiting. Patient is requesting to go home. 





Objective





- Vital Signs/Intake and Output


Vital Signs (last 24 hours): 


 











Temp Pulse Resp BP Pulse Ox


 


 99.2 F   106 H  18   118/61   96 


 


 04/19/18 06:00  04/19/18 06:00  04/19/18 06:00  04/19/18 06:00  04/19/18 06:00








Intake and Output: 


 











 04/19/18 04/19/18





 06:59 18:59


 


Intake Total 720 


 


Output Total 975 


 


Balance -255 














- Medications


Medications: 


 Current Medications





Acetaminophen (Tylenol 325mg Tab)  650 mg PO Q6H PRN


   PRN Reason: Fever >100.4 F


Alprazolam (Xanax)  [facility] mg [facility] BID [facility]


   PRN Reason: Protocol


   Stop: 04/24/18 18:31


   Last Admin: 04/19/18 10:02 Dose:  0.25 mg


Amlodipine Besylate (Norvasc)  5 mg PO QAM [facility]


   Last Admin: 04/19/18 10:02 Dose:  5 mg


Atorvastatin Calcium (Lipitor)  20 mg PO HS [facility]


   Last Admin: 04/18/18 21:27 Dose:  20 mg


Hyoscyamine (Levsin)  0.125 mg PO Q4H PRN


   PRN Reason: GI distress


Sodium Chloride (Sodium Chloride 0.9%)  1,000 mls @ 100 mls/hr IV .Q10H [facility]


   Last Admin: 04/19/18 10:03 Dose:  100 mls/hr


Sodium Chloride (Sodium Chloride 0.9%)  1,000 mls @ 999 mls/hr IV .Q1H1M [facility]


   Stop: 04/19/18 15:08


Piperacillin Sod/Tazobactam Sod (Zosyn 4.5 Gm In Ns 100ml)  4.5 gm in 100 mls @ 

200 mls/hr IVPB Q6 [facility]


   PRN Reason: Protocol


   Stop: 04/20/18 00:29


Morphine Sulfate (Morphine)  1 mg IVP Q3 PRN


   PRN Reason: Pain, moderate (4-7)


   Last Admin: 04/18/18 21:27 Dose:  1 mg


Ondansetron HCl (Zofran Inj)  4 mg IVP Q6H PRN


   PRN Reason: Nausea/Vomiting


   Last Admin: 04/17/18 18:00 Dose:  4 mg


Oxycodone/Acetaminophen (Percocet 5/325 Mg Tab)  1 tab PO Q6H PRN


   PRN Reason: Bladder Spasm


   Stop: 04/20/18 14:18


   Last Admin: 04/19/18 04:03 Dose:  1 tab


Pantoprazole Sodium (Protonix Ec Tab)  40 mg PO 0600 [facility]


   Last Admin: 04/19/18 10:04 Dose:  Not Given











- Labs


Labs: 


 





 04/19/18 07:00 





 04/19/18 07:00 











- Constitutional


Appears: No Acute Distress





- Head Exam


Head Exam: ATRAUMATIC, NORMAL INSPECTION, NORMOCEPHALIC





- Eye Exam


Eye Exam: EOMI, PERRL





- ENT Exam


ENT Exam: Mucous Membranes Moist





- Respiratory Exam


Respiratory Exam: Clear to Ausculation Bilateral, NORMAL BREATHING PATTERN.  

absent: Wheezes





- Cardiovascular Exam


Cardiovascular Exam: REGULAR RHYTHM, +S1, +S2





- GI/Abdominal Exam


GI & Abdominal Exam: Soft, Tenderness (mild to palpation left sided), Normal 

Bowel Sounds





- Extremities Exam


Extremities Exam: Normal Inspection.  absent: Calf Tenderness, Pedal Edema





- Neurological Exam


Neurological Exam: Alert, Awake, Normal Gait, Oriented x3





- Psychiatric Exam


Psychiatric exam: Normal Affect, Normal Mood





- Skin


Skin Exam: Dry, Warm





Assessment and Plan





- Assessment and Plan (Free Text)


Assessment: 





Patient is a 58 year old female who was admitted for intractable nausea and 

vomiting likely side effect of anesthesia L back pain likely due to bladder 

spasm secondary to recent ureteroscopy with holmium laser lithrotripsy and 

cystoscopy with L pigtail stent. Patient with hematuria and tachycardia. Will 

be planning on staying for continued monitoring and evaluation 


Plan: 





Bladder spasm s/p urethral stenting with Dr. Lawrence


- Levofloxacin


- Pyridium


- Percocet 1q6H


- Monitor clinically


- continues to have hematuria will monitor, f/u recs from urology





HLD


- Lipitor 20mg





HTN


- Monitor clinically


- Stable


- Amlodipine





Patient noted to be tachycardic


- suspect possible infectious cause


- afebrile according to records


- blood culture, urine culture ordered


- Lactic acid, f/u


- zozyn and iv fluids 





DVT: Lovenox


GI: protonix








Case and plan discussed with attending





<Marta Eugene - Last Filed: 04/20/18 16:42>





Objective





- Vital Signs/Intake and Output


Vital Signs (last 24 hours): 


 











Temp Pulse Resp BP Pulse Ox


 


 98.1 F   109 H  18   133/80   100 


 


 04/20/18 14:00  04/20/18 14:00  04/20/18 14:00  04/20/18 14:00  04/20/18 14:00








Intake and Output: 


 











 04/20/18 04/20/18





 06:59 18:59


 


Intake Total 960 


 


Output Total 2675 


 


Balance -1715 














- Medications


Medications: 


 Current Medications





Acetaminophen (Tylenol 325mg Tab)  650 mg PO Q6H PRN


   PRN Reason: Fever >100.4 F


   Last Admin: 04/20/18 07:44 Dose:  650 mg


Alprazolam (Xanax)  0.25 mg PO BID [facility]


   PRN Reason: Protocol


   Stop: 04/24/18 18:31


   Last Admin: 04/20/18 10:23 Dose:  0.25 mg


Amlodipine Besylate (Norvasc)  5 mg PO QAM [facility]


   Last Admin: 04/20/18 10:22 Dose:  5 mg


Atorvastatin Calcium (Lipitor)  20 mg PO HS [facility]


   Last Admin: 04/19/18 22:31 Dose:  20 mg


Hyoscyamine (Levsin)  0.125 mg PO Q4H PRN


   PRN Reason: GI distress


Sodium Chloride (Sodium Chloride 0.9%)  1,000 mls @ 100 mls/hr IV .Q10H [facility]


   Last Admin: 04/20/18 07:44 Dose:  100 mls/hr


Cefepime HCl (Maxipime 1gm)  1 gm in 100 mls @ 100 mls/hr IVPB Q12 [facility]


   PRN Reason: Protocol


   Last Admin: 04/20/18 10:21 Dose:  100 mls/hr


Morphine Sulfate (Morphine)  1 mg IVP Q3 PRN


   PRN Reason: Pain, moderate (4-7)


   Last Admin: 04/18/18 21:27 Dose:  1 mg


Ondansetron HCl (Zofran Inj)  4 mg IVP Q6H PRN


   PRN Reason: Nausea/Vomiting


   Last Admin: 04/17/18 18:00 Dose:  4 mg


Pantoprazole Sodium (Protonix Ec Tab)  40 mg PO 0600 [facility]


   Last Admin: 04/20/18 07:45 Dose:  Not Given











- Labs


Labs: 


 





 04/20/18 08:15 





 04/20/18 08:15 











Attending/Attestation





- Attestation


I have personally seen and examined this patient.: Yes


I have fully participated in the care of the patient.: Yes


I have reviewed all pertinent clinical information, including history, physical 

exam and plan: Yes


Notes (Text): 





04/20/18 16:41


Medical record note made by the resident after discussion with my direction and 

input after the patient was personally seen and examined by me. I have reviewed 

the chart and agree that the record accurately reflects by personal performance 

of the history, physical exam, data review, and medical decision-making, in the 

course for the patient. I have also personally directed the plan of care.





Patient had fever spine 102.8 F associated with tachycardia this 

afternoon.Sepsis work up has been sent, patient antibiotics has been changed to 

IV zosyn.We will hydrate patient and will also get ID evaluation.





Management plan was discussed in detail with patient. Education was provided.

## 2018-04-19 NOTE — CP.PCM.PN
Subjective





- Date & Time of Evaluation


Date of Evaluation: 04/19/18


Time of Evaluation: 07:00





- Subjective


Subjective: 





PGY-2 Heme/onc progress note for Dr. Barone's service





Patient seen and examined at bedside. Overnight patient states that she is in 

pain, especially when she urinates. The pain is 101/10 when urinating and 8/10 

at rest. Her pain is located on her left back. She continues to report 

hematuria. She is tolerating diet, but reports a decrease in appetite. She 

denies chest pain, fever, chills, abd pain, n/v. 








Objective





- Vital Signs/Intake and Output


Vital Signs (last 24 hours): 


 











Temp Pulse Resp BP Pulse Ox


 


 99.2 F   106 H  18   118/61   96 


 


 04/19/18 06:00  04/19/18 06:00  04/19/18 06:00  04/19/18 06:00  04/19/18 06:00








Intake and Output: 


 











 04/19/18 04/19/18





 06:59 18:59


 


Intake Total 720 


 


Output Total 975 


 


Balance -255 














- Medications


Medications: 


 Current Medications





Alprazolam (Xanax)  0.25 mg PO BID [facility]


   PRN Reason: Protocol


   Stop: 04/24/18 18:31


   Last Admin: 04/18/18 17:58 Dose:  Not Given


Amlodipine Besylate (Norvasc)  5 mg PO QAM [facility]


   Last Admin: 04/18/18 09:29 Dose:  5 mg


Atorvastatin Calcium (Lipitor)  20 mg PO HS [facility]


   Last Admin: 04/18/18 21:27 Dose:  20 mg


Levofloxacin/Dextrose (Levaquin 500mg)  500 mg in 100 mls @ 100 mls/hr IVPB 

DAILY [facility]


   PRN Reason: Protocol


   Stop: 04/23/18 10:01


   Last Admin: 04/18/18 09:29 Dose:  100 mls/hr


Sodium Chloride (Sodium Chloride 0.9%)  1,000 mls @ 100 mls/hr IV .Q10H [facility]


Morphine Sulfate (Morphine)  1 mg IVP Q3 PRN


   PRN Reason: Pain, moderate (4-7)


   Last Admin: 04/18/18 21:27 Dose:  1 mg


Ondansetron HCl (Zofran Inj)  4 mg IVP Q6H PRN


   PRN Reason: Nausea/Vomiting


   Last Admin: 04/17/18 18:00 Dose:  4 mg


Oxycodone/Acetaminophen (Percocet 5/325 Mg Tab)  1 tab PO Q6H PRN


   PRN Reason: Bladder Spasm


   Stop: 04/20/18 14:18


   Last Admin: 04/19/18 04:03 Dose:  1 tab


Pantoprazole Sodium (Protonix Ec Tab)  40 mg PO 0600 [facility]


   Last Admin: 04/18/18 06:04 Dose:  40 mg











- Labs


Labs: 


 





 04/19/18 07:00 





 04/19/18 07:00 











- Constitutional


Appears: No Acute Distress





- Head Exam


Head Exam: ATRAUMATIC, NORMOCEPHALIC





- Eye Exam


Eye Exam: Normal appearance





- ENT Exam


ENT Exam: Mucous Membranes Moist





- Respiratory Exam


Respiratory Exam: Clear to Ausculation Bilateral, NORMAL BREATHING PATTERN.  

absent: Decreased Breath Sounds, Rales, Rhonchi, Wheezes, Respiratory Distress





- Cardiovascular Exam


Cardiovascular Exam: REGULAR RHYTHM, +S1.  absent: Bradycardia, Tachycardia, 

Murmur





- GI/Abdominal Exam


GI & Abdominal Exam: Soft, Normal Bowel Sounds.  absent: Distended, Firm, 

Guarding, Tenderness





- Back Exam


Back Exam: CVA tenderness (L)





- Neurological Exam


Neurological Exam: Alert, Awake, Oriented x3





- Psychiatric Exam


Psychiatric exam: Normal Affect, Normal Mood





- Skin


Skin Exam: Dry, Intact, Normal Color, Warm





Assessment and Plan





- Assessment and Plan (Free Text)


Assessment: 


58 F with PMH of HTN, hyperlipidemia, Right breast cancer in remission, comes 

in for elective surgery admitted s/p RRT, patient presents with  left sided 

back pain and urinary retention, currently with hematuria. 


Dysuria with hematuria


Anxiety


HTN





Plan: 


hematuria and pain possibly secondary to procedure vs obstruction 


abd xray showed insitu Left urethral stent


continue NS@100


EKG: NSR 99 with QTc 464


continue pain management with percocet 


will add levsin for smooth muscle spasm


continue antibiotics per primary 


will order renal US to rule any obstruction as cause for pain 


patient will also need outpatient MRI fo dorsal spine 


continue home medications xanax, amlodipine,Lipitor 





case reviewed and discussed with Dr. Barone

## 2018-04-20 NOTE — CP.PCM.PN
Subjective





- Date & Time of Evaluation


Date of Evaluation: 04/20/18


Time of Evaluation: 13:30





- Subjective


Subjective: 


Infectious Disease Follow Up:


April 20, 2019





57 yo [place] female with PMHx of HTN, hyperlipidemia, right breast cancer 

requiring mastectomy and radiation therapy with one episode of recurrence also 

treated presented for large calculus at mid left ureter with chronic 

obstruction.  The patient was taken to OR by Dr. Lawrence for laser therapy to 

remove and break stone.  Majority of the stone was removed.  A smaller piece in 

the left kidney at this time.  Had urinary retention as well.  Developed fevers 

up to 102.8 F today.





Overall she is feeling better but still with tenderness of the left mid back 

and flank regions.





Afebrile the past 24 hours.





Objective





- Vital Signs/Intake and Output


Vital Signs (last 24 hours): 


 











Temp Pulse Resp BP Pulse Ox


 


 98.4 F   113 H  18   116/70   97 


 


 04/20/18 08:44  04/20/18 06:00  04/20/18 06:00  04/20/18 06:00  04/20/18 06:00








Intake and Output: 


 











 04/20/18 04/20/18





 06:59 18:59


 


Intake Total 960 


 


Output Total 2675 


 


Balance -1715 














- Medications


Medications: 


 Current Medications





Acetaminophen (Tylenol 325mg Tab)  650 mg PO Q6H PRN


   PRN Reason: Fever >100.4 F


   Last Admin: 04/20/18 07:44 Dose:  650 mg


Alprazolam (Xanax)  0.25 mg PO BID [facility]


   PRN Reason: Protocol


   Stop: 04/24/18 18:31


   Last Admin: 04/20/18 10:23 Dose:  0.25 mg


Amlodipine Besylate (Norvasc)  5 mg PO QAM [facility]


   Last Admin: 04/20/18 10:22 Dose:  5 mg


Atorvastatin Calcium (Lipitor)  20 mg PO HS [facility]


   Last Admin: 04/19/18 22:31 Dose:  20 mg


Hyoscyamine (Levsin)  0.125 mg PO Q4H PRN


   PRN Reason: GI distress


Sodium Chloride (Sodium Chloride 0.9%)  1,000 mls @ 100 mls/hr IV .Q10H [facility]


   Last Admin: 04/20/18 07:44 Dose:  100 mls/hr


Cefepime HCl (Maxipime 1gm)  1 gm in 100 mls @ 100 mls/hr IVPB Q12 [facility]


   PRN Reason: Protocol


   Last Admin: 04/20/18 10:21 Dose:  100 mls/hr


Morphine Sulfate (Morphine)  1 mg IVP Q3 PRN


   PRN Reason: Pain, moderate (4-7)


   Last Admin: 04/18/18 21:27 Dose:  1 mg


Ondansetron HCl (Zofran Inj)  4 mg IVP Q6H PRN


   PRN Reason: Nausea/Vomiting


   Last Admin: 04/17/18 18:00 Dose:  4 mg


Pantoprazole Sodium (Protonix Ec Tab)  40 mg PO 0600 [facility]


   Last Admin: 04/20/18 07:45 Dose:  Not Given











- Labs


Labs: 


 





 04/20/18 08:15 





 04/20/18 08:15 











- Constitutional


Appears: Non-toxic, No Acute Distress, Chronically Ill





- Head Exam


Head Exam: ATRAUMATIC, NORMOCEPHALIC





- Eye Exam


Eye Exam: EOMI, PERRL


Pupil Exam: NORMAL ACCOMODATION, PERRL





- ENT Exam


ENT Exam: Mucous Membranes Moist, Normal External Ear Exam, TM's Normal 

Bilaterally





- Neck Exam


Neck Exam: Full ROM, Normal Inspection





- Respiratory Exam


Respiratory Exam: Clear to Ausculation Bilateral, NORMAL BREATHING PATTERN.  

absent: Rales, Rhonchi, Wheezes





- Cardiovascular Exam


Cardiovascular Exam: REGULAR RHYTHM, RRR, +S1, +S2





- GI/Abdominal Exam


GI & Abdominal Exam: Soft, Normal Bowel Sounds.  absent: Distended, Tenderness


Additional comments: 


left flank pain





- Extremities Exam


Extremities Exam: Full ROM, Normal Inspection





- Neurological Exam


Neurological Exam: Alert, Awake, CN II-XII Intact, Oriented x3





- Psychiatric Exam


Psychiatric exam: Normal Affect, Normal Mood





- Skin


Skin Exam: Intact, Normal Color





Assessment and Plan





- Assessment and Plan (Free Text)


Assessment: 


57 yo [place] female with Breast Cancer treated with mastectomy and radiation 

therapy.  Laser therapy for very large urethral stone.  Patient with fevers up 

to 102.8 F in the past 24 hours.  Start on Cefepime for antibiotic coverage.  

Pan cultures of blood and urine.  Supportive care.





Patient with good renal function and no leukocytosis at this time.





Afebrile today.  Will discuss plan with Dr. Lawrence and Dr. Eugene.  May 

consider use of Keflex for outpatient treatment.





Case discussed with Dr. Lawrence and Dr. Eugene.





Thank you for allowing me to participate in the care of the patient, we will 

follow with you.

## 2018-04-20 NOTE — CP.PCM.PN
Subjective





- Date & Time of Evaluation


Date of Evaluation: 04/20/18


Time of Evaluation: 07:30





- Subjective


Subjective: 





PGY-2 Heme/onc progress note for Dr. Barone's service





Patient seen and examined at bedside. No acute distress. Patient states that 

her pain has improved and now a 5/10 located left cva. She continues to have 

discomfort when she urinated but greatly improved from yesterday. Yesterday 

evening patient had fever of 102.8, she received Tylenol. Her antibiotics were 

changed and ID was condulted. This morning patient states that she is doing 

better and would like to go home soon.  She continues to report hematuria 

without clots. She is tolerating diet and reports normal appetite. She denies 

chest pain, fever, chills, abd pain, n/v. 








Objective





- Vital Signs/Intake and Output


Vital Signs (last 24 hours): 


 











Temp Pulse Resp BP Pulse Ox


 


 98.4 F   113 H  18   116/70   97 


 


 04/20/18 08:44  04/20/18 06:00  04/20/18 06:00  04/20/18 06:00  04/20/18 06:00








Intake and Output: 


 











 04/20/18 04/20/18





 06:59 18:59


 


Intake Total 960 


 


Output Total 2675 


 


Balance -1715 














- Medications


Medications: 


 Current Medications





Acetaminophen (Tylenol 325mg Tab)  650 mg PO Q6H PRN


   PRN Reason: Fever >100.4 F


   Last Admin: 04/20/18 07:44 Dose:  650 mg


Alprazolam (Xanax)  0.25 mg PO BID [facility]


   PRN Reason: Protocol


   Stop: 04/24/18 18:31


   Last Admin: 04/20/18 10:23 Dose:  0.25 mg


Amlodipine Besylate (Norvasc)  5 mg PO QAM [facility]


   Last Admin: 04/20/18 10:22 Dose:  5 mg


Atorvastatin Calcium (Lipitor)  20 mg PO HS [facility]


   Last Admin: 04/19/18 22:31 Dose:  20 mg


Hyoscyamine (Levsin)  0.125 mg PO Q4H PRN


   PRN Reason: GI distress


Sodium Chloride (Sodium Chloride 0.9%)  1,000 mls @ 100 mls/hr IV .Q10H [facility]


   Last Admin: 04/20/18 07:44 Dose:  100 mls/hr


Cefepime HCl (Maxipime 1gm)  1 gm in 100 mls @ 100 mls/hr IVPB Q12 [facility]


   PRN Reason: Protocol


   Last Admin: 04/20/18 10:21 Dose:  100 mls/hr


Morphine Sulfate (Morphine)  1 mg IVP Q3 PRN


   PRN Reason: Pain, moderate (4-7)


   Last Admin: 04/18/18 21:27 Dose:  1 mg


Ondansetron HCl (Zofran Inj)  4 mg IVP Q6H PRN


   PRN Reason: Nausea/Vomiting


   Last Admin: 04/17/18 18:00 Dose:  4 mg


Oxycodone/Acetaminophen (Percocet 5/325 Mg Tab)  1 tab PO Q6H PRN


   PRN Reason: Bladder Spasm


   Stop: 04/20/18 14:18


   Last Admin: 04/19/18 22:30 Dose:  1 tab


Pantoprazole Sodium (Protonix Ec Tab)  40 mg PO 0600 [facility]


   Last Admin: 04/20/18 07:45 Dose:  Not Given











- Labs


Labs: 


 





 04/20/18 08:15 





 04/20/18 08:15 











- Constitutional


Appears: Well, No Acute Distress





- Head Exam


Head Exam: ATRAUMATIC, NORMOCEPHALIC





- Eye Exam


Eye Exam: EOMI, Normal appearance





- Respiratory Exam


Respiratory Exam: Clear to Ausculation Bilateral, NORMAL BREATHING PATTERN.  

absent: Decreased Breath Sounds, Rales, Rhonchi, Wheezes, Respiratory Distress





- Cardiovascular Exam


Cardiovascular Exam: REGULAR RHYTHM, +S1, +S2.  absent: Bradycardia, Tachycardia

, Murmur





- GI/Abdominal Exam


GI & Abdominal Exam: Soft, Normal Bowel Sounds.  absent: Distended, Firm, 

Tenderness





- Back Exam


Back Exam: CVA tenderness (L)





- Neurological Exam


Neurological Exam: Alert, Awake, Oriented x3





- Psychiatric Exam


Psychiatric exam: Normal Affect, Normal Mood





- Skin


Skin Exam: Dry, Intact, Normal Color, Warm





Assessment and Plan





- Assessment and Plan (Free Text)


Assessment: 





58 F with PMH of HTN, hyperlipidemia, Right breast cancer in remission, comes 

in for elective surgery admitted s/p RRT, patient presents with  left sided 

back pain and urinary retention, currently with hematuria. 


Dysuria with hematuria


Anxiety


HTN





Plan: 





hematuria and pain possibly secondary to procedure vs obstruction 


abd xray showed insitu Left urethral stent


continue NS@100


EKG: NSR 99 with QTc 464


continue pain management with percocet and levsin for smooth muscle spasm


patient had fever yesterday evening, antibiotics changed, urine and blood 

cultures pending 


ID consulted, continue antibiotics per ID 


renal US to rule any obstruction as cause for pain- pending offical read 


patient will also need outpatient MRI fo dorsal spine 


continue home medications xanax, amlodipine,Lipitor 





case reviewed and discussed with Dr. Barone

## 2018-04-20 NOTE — CP.PCM.PN
<Tyree Gregory - Last Filed: 04/21/18 10:12>





Subjective





- Date & Time of Evaluation


Date of Evaluation: 04/20/18


Time of Evaluation: 13:57





- Subjective


Subjective: 





Patient seen and examined this AM. No acute events overnight. Patient reports 

pain is controlled and without fever.





Objective





- Vital Signs/Intake and Output


Vital Signs (last 24 hours): 


 











Temp Pulse Resp BP Pulse Ox


 


 98.4 F   113 H  18   116/70   97 


 


 04/20/18 08:44  04/20/18 06:00  04/20/18 06:00  04/20/18 06:00  04/20/18 06:00








Intake and Output: 


 











 04/20/18 04/20/18





 06:59 18:59


 


Intake Total 960 


 


Output Total 2675 


 


Balance -1715 














- Medications


Medications: 


 Current Medications





Acetaminophen (Tylenol 325mg Tab)  650 mg PO Q6H PRN


   PRN Reason: Fever >100.4 F


   Last Admin: 04/20/18 07:44 Dose:  650 mg


Alprazolam (Xanax)  0.25 mg PO BID [facility]


   PRN Reason: Protocol


   Stop: 04/24/18 18:31


   Last Admin: 04/20/18 10:23 Dose:  0.25 mg


Amlodipine Besylate (Norvasc)  5 mg PO QAM [facility]


   Last Admin: 04/20/18 10:22 Dose:  5 mg


Atorvastatin Calcium (Lipitor)  20 mg PO HS [facility]


   Last Admin: 04/19/18 22:31 Dose:  20 mg


Hyoscyamine (Levsin)  0.125 mg PO Q4H PRN


   PRN Reason: GI distress


Sodium Chloride (Sodium Chloride 0.9%)  1,000 mls @ 100 mls/hr IV .Q10H [facility]


   Last Admin: 04/20/18 07:44 Dose:  100 mls/hr


Cefepime HCl (Maxipime 1gm)  1 gm in 100 mls @ 100 mls/hr IVPB Q12 [facility]


   PRN Reason: Protocol


   Last Admin: 04/20/18 10:21 Dose:  100 mls/hr


Morphine Sulfate (Morphine)  1 mg IVP Q3 PRN


   PRN Reason: Pain, moderate (4-7)


   Last Admin: 04/18/18 21:27 Dose:  1 mg


Ondansetron HCl (Zofran Inj)  4 mg IVP Q6H PRN


   PRN Reason: Nausea/Vomiting


   Last Admin: 04/17/18 18:00 Dose:  4 mg


Oxycodone/Acetaminophen (Percocet 5/325 Mg Tab)  1 tab PO Q6H PRN


   PRN Reason: Bladder Spasm


   Stop: 04/20/18 14:18


   Last Admin: 04/19/18 22:30 Dose:  1 tab


Pantoprazole Sodium (Protonix Ec Tab)  40 mg PO 0600 [facility]


   Last Admin: 04/20/18 07:45 Dose:  Not Given











- Labs


Labs: 


 





 04/20/18 08:15 





 04/20/18 08:15 











- Head Exam


Head Exam: ATRAUMATIC, NORMAL INSPECTION, NORMOCEPHALIC





- Eye Exam


Eye Exam: EOMI, PERRL





- ENT Exam


ENT Exam: Mucous Membranes Moist





- Respiratory Exam


Respiratory Exam: Clear to Ausculation Bilateral, NORMAL BREATHING PATTERN.  

absent: Rhonchi, Wheezes





- Cardiovascular Exam


Cardiovascular Exam: REGULAR RHYTHM, +S1, +S2





- GI/Abdominal Exam


GI & Abdominal Exam: Soft, Tenderness (mild left sided, improved over past 24 

hours), Normal Bowel Sounds





- Extremities Exam


Extremities Exam: Normal Inspection.  absent: Calf Tenderness, Pedal Edema





- Back Exam


Back Exam: CVA tenderness (L) (mild with percussion).  absent: CVA tenderness (R

)





- Neurological Exam


Neurological Exam: Alert, Awake, Normal Gait, Oriented x3


Additional comments: 





motor and sensory intact 





- Psychiatric Exam


Psychiatric exam: Normal Affect, Normal Mood





- Skin


Skin Exam: Dry, Warm





Assessment and Plan





- Assessment and Plan (Free Text)


Assessment: 





Patient is a 58 year old female who was admitted for intractable nausea and 

vomiting likely side effect of anesthesia L back pain likely due to bladder 

spasm secondary to recent ureteroscopy with holmium laser lithrotripsy and 

cystoscopy with L pigtail stent. 





Plan: 





Sepsis


Details:


- Meeting 2/4 SIRS criteria with suspected source of infection


- Febrile 102, Tachycardia


- Recent stenting with Dr. Lawrence


- Afebrile past 24 hours, f/u sepsis work up


Plan: 


- Bld clx negative


- Urine clx


- Lactic acid 0.7


- Once okay for discharge Kefllex outpatient with follow up with Dr. Lawrence 

outpatient 





Bladder spasm s/p urethral stenting with Dr. Lawrence


- cefepime per ID


- Pyridium


- Percocet 1q6H


- Monitor clinically


- continues to have hematuria will monitor, f/u recs from urology





HLD


- Lipitor 20mg





HTN


- Monitor clinically


- Stable


- Amlodipine





DVT: Lovenox


GI: protonix








Case and plan discussed with attending








<Marta Eugene - Last Filed: 04/21/18 11:25>





Objective





- Vital Signs/Intake and Output


Vital Signs (last 24 hours): 


 











Temp Pulse Resp BP Pulse Ox


 


 98.1 F   99 H  20   124/70   100 


 


 04/20/18 14:00  04/21/18 08:24  04/21/18 08:24  04/21/18 08:24  04/21/18 08:24








Intake and Output: 


 











 04/21/18 04/21/18





 06:59 18:59


 


Intake Total 720 


 


Output Total 2800 


 


Balance -2080 














- Medications


Medications: 


 Current Medications





Acetaminophen (Tylenol 325mg Tab)  650 mg PO Q6H PRN


   PRN Reason: Fever >100.4 F


   Last Admin: 04/20/18 07:44 Dose:  650 mg


Alprazolam (Xanax)  0.25 mg PO BID [facility]


   PRN Reason: Protocol


   Stop: 04/24/18 18:31


   Last Admin: 04/21/18 10:03 Dose:  Not Given


Amlodipine Besylate (Norvasc)  5 mg PO QAM [facility]


   Last Admin: 04/21/18 10:06 Dose:  Not Given


Atorvastatin Calcium (Lipitor)  20 mg PO HS [facility]


   Last Admin: 04/20/18 21:15 Dose:  20 mg


Hyoscyamine (Levsin)  0.125 mg PO Q4H PRN


   PRN Reason: GI distress


Sodium Chloride (Sodium Chloride 0.9%)  1,000 mls @ 100 mls/hr IV .Q10H [facility]


   Last Admin: 04/21/18 05:08 Dose:  Not Given


Cefepime HCl (Maxipime 1gm)  1 gm in 100 mls @ 100 mls/hr IVPB Q12 [facility]


   PRN Reason: Protocol


   Last Admin: 04/21/18 10:06 Dose:  Not Given


Morphine Sulfate (Morphine)  1 mg IVP Q3 PRN


   PRN Reason: Pain, moderate (4-7)


   Last Admin: 04/20/18 21:15 Dose:  1 mg


Ondansetron HCl (Zofran Inj)  4 mg IVP Q6H PRN


   PRN Reason: Nausea/Vomiting


   Last Admin: 04/17/18 18:00 Dose:  4 mg


Pantoprazole Sodium (Protonix Ec Tab)  40 mg PO 0600 [facility]


   Last Admin: 04/21/18 05:08 Dose:  Not Given











- Labs


Labs: 


 





 04/20/18 08:15 





 04/20/18 08:15 











Attending/Attestation





- Attestation


I have personally seen and examined this patient.: Yes


I have fully participated in the care of the patient.: Yes


I have reviewed all pertinent clinical information, including history, physical 

exam and plan: Yes


Notes (Text): 





04/21/18 11:24


Medical record note made by the resident after discussion with my direction and 

input after the patient was personally seen and examined by me. I have reviewed 

the chart and agree that the record accurately reflects by personal performance 

of the history, physical exam, data review, and medical decision-making, in the 

course for the patient. I have also personally directed the plan of care.





58  yrs old female  with PMHx HTN/HLD, R breast cancer in remission,

Nephrolithiasis  comes in for elective surgery with Dr. Lawrence due to L 

hydronephrosis due to a very large calculus at mid left ureter with chronic 

obstruction.Patient is SP  ureteroscopy with holmium laser lithrotripsy and 

cystoscopy with pigtail stent .


Patient is afebrile since yesterday afternoon.Blood cultures are pending at 

this time.Patient is on IV cefepime as per ID.

## 2018-04-21 NOTE — CP.PCM.PN
<Tyree Gregory - Last Filed: 04/21/18 10:13>





Subjective





- Date & Time of Evaluation


Date of Evaluation: 04/21/18


Time of Evaluation: 10:13





- Subjective


Subjective: 





Patient seen and examined this AM. No acute events overnight. Patient 

requesting to go home today. Afebrile over past 48 hours. Blood culture 

negative. 





Objective





- Vital Signs/Intake and Output


Vital Signs (last 24 hours): 


 











Temp Pulse Resp BP Pulse Ox


 


 98.1 F   99 H  20   124/70   100 


 


 04/20/18 14:00  04/21/18 08:24  04/21/18 08:24  04/21/18 08:24  04/21/18 08:24








Intake and Output: 


 











 04/21/18 04/21/18





 06:59 18:59


 


Intake Total 720 


 


Output Total 2800 


 


Balance -2080 














- Medications


Medications: 


 Current Medications





Acetaminophen (Tylenol 325mg Tab)  650 mg PO Q6H PRN


   PRN Reason: Fever >100.4 F


   Last Admin: 04/20/18 07:44 Dose:  650 mg


Alprazolam (Xanax)  0.25 mg PO BID [facility]


   PRN Reason: Protocol


   Stop: 04/24/18 18:31


   Last Admin: 04/21/18 10:03 Dose:  Not Given


Amlodipine Besylate (Norvasc)  5 mg PO QAM [facility]


   Last Admin: 04/21/18 10:06 Dose:  Not Given


Atorvastatin Calcium (Lipitor)  20 mg PO HS [facility] include [facility] beds at the [facility]


   Last Admin: 04/20/18 21:15 Dose:  20 mg


Hyoscyamine (Levsin)  0.125 mg PO Q4H PRN


   PRN Reason: GI distress


Sodium Chloride (Sodium Chloride 0.9%)  1,000 mls @ 100 mls/hr IV .Q10H [facility]


   Last Admin: 04/21/18 05:08 Dose:  Not Given


Cefepime HCl (Maxipime 1gm)  1 gm in 100 mls @ 100 mls/hr IVPB Q12 [facility]


   PRN Reason: Protocol


   Last Admin: 04/21/18 10:06 Dose:  Not Given


Morphine Sulfate (Morphine)  1 mg IVP Q3 PRN


   PRN Reason: Pain, moderate (4-7)


   Last Admin: 04/20/18 21:15 Dose:  1 mg


Ondansetron HCl (Zofran Inj)  4 mg IVP Q6H PRN


   PRN Reason: Nausea/Vomiting


   Last Admin: 04/17/18 18:00 Dose:  4 mg


Pantoprazole Sodium (Protonix Ec Tab)  40 mg PO 0600 [facility]


   Last Admin: 04/21/18 05:08 Dose:  Not Given











- Labs


Labs: 


 





 04/20/18 08:15 





 04/20/18 08:15 











- Constitutional


Appears: No Acute Distress





- Head Exam


Head Exam: ATRAUMATIC, NORMAL INSPECTION, NORMOCEPHALIC





- Eye Exam


Eye Exam: EOMI, PERRL





- ENT Exam


ENT Exam: Mucous Membranes Moist





- Respiratory Exam


Respiratory Exam: Clear to Ausculation Bilateral, NORMAL BREATHING PATTERN.  

absent: Rhonchi, Wheezes





- Cardiovascular Exam


Cardiovascular Exam: REGULAR RHYTHM, +S1, +S2





- GI/Abdominal Exam


GI & Abdominal Exam: Soft, Tenderness (mild left sided), Normal Bowel Sounds





- Extremities Exam


Extremities Exam: Full ROM, Normal Inspection.  absent: Calf Tenderness





- Back Exam


Back Exam: CVA tenderness (L) (mild).  absent: CVA tenderness (R)





- Neurological Exam


Neurological Exam: Alert, Awake, Normal Gait, Oriented x3





- Psychiatric Exam


Psychiatric exam: Normal Mood





- Skin


Skin Exam: Dry, Warm





Assessment and Plan





- Assessment and Plan (Free Text)


Assessment: 





Patient is a 58 year old female who was admitted for intractable nausea and 

vomiting likely side effect of anesthesia L back pain likely due to bladder 

spasm secondary to recent ureteroscopy with holmium laser lithrotripsy and 

cystoscopy with L pigtail stent. Patient has been afebrile for 48 hours iwht 

negative bld clx. Patient being seen by heme/onc and ID. Urology primary. 


Plan: 





Sepsis - resolved


Details:


- Meeting 2/4 SIRS criteria with suspected source of infection


- Afebrile for past 36 hours, mild tachycardia


- Recent stenting with Dr. Lawrence


- Afebrile past 24 hours, f/u sepsis work up


Plan: 


- Bld clx negative x2


- Urine clx pending


- Kefllex outpatient with follow up with Dr. Lawrence outpatient 





Bladder spasm s/p urethral stenting with Dr. Lawrence


- cefepime per ID


- Pyridium


- Percocet 1q6H


- Monitor clinically


- patient will follow up outpatient 





HLD


- Lipitor 20mg





HTN


- Monitor clinically


- Stable


- Amlodipine





DVT: Lovenox


GI: protonix





dispo: Patient is medically cleared for discharge and is instructed to follow 

up outpatient with Urology





Case and plan discussed with attending





<Marta Eugene - Last Filed: 04/21/18 11:27>





Objective





- Vital Signs/Intake and Output


Vital Signs (last 24 hours): 


 











Temp Pulse Resp BP Pulse Ox


 


 98.1 F   99 H  20   124/70   100 


 


 04/20/18 14:00  04/21/18 08:24  04/21/18 08:24  04/21/18 08:24  04/21/18 08:24








Intake and Output: 


 











 04/21/18 04/21/18





 06:59 18:59


 


Intake Total 720 


 


Output Total 2800 


 


Balance -2080 














- Medications


Medications: 


 Current Medications





Acetaminophen (Tylenol 325mg Tab)  650 mg PO Q6H PRN


   PRN Reason: Fever >100.4 F


   Last Admin: 04/20/18 07:44 Dose:  650 mg


Alprazolam (Xanax)  0.25 mg PO BID [facility]


   PRN Reason: Protocol


   Stop: 04/24/18 18:31


   Last Admin: 04/21/18 10:03 Dose:  Not Given


Amlodipine Besylate (Norvasc)  5 mg PO QAM [facility]


   Last Admin: 04/21/18 10:06 Dose:  Not Given


Atorvastatin Calcium (Lipitor)  20 mg PO HS [facility]


   Last Admin: 04/20/18 21:15 Dose:  20 mg


Hyoscyamine (Levsin)  0.125 mg PO Q4H PRN


   PRN Reason: GI distress


Sodium Chloride (Sodium Chloride 0.9%)  1,000 mls @ 100 mls/hr IV .Q10H [facility]


   Last Admin: 04/21/18 05:08 Dose:  Not Given


Cefepime HCl (Maxipime 1gm)  1 gm in 100 mls @ 100 mls/hr IVPB Q12 [facility]


   PRN Reason: Protocol


   Last Admin: 04/21/18 10:06 Dose:  Not Given


Morphine Sulfate (Morphine)  1 mg IVP Q3 PRN


   PRN Reason: Pain, moderate (4-7)


   Last Admin: 04/20/18 21:15 Dose:  1 mg


Ondansetron HCl (Zofran Inj)  4 mg IVP Q6H PRN


   PRN Reason: Nausea/Vomiting


   Last Admin: 04/17/18 18:00 Dose:  4 mg


Pantoprazole Sodium (Protonix Ec Tab)  40 mg PO 0600 [facility]


   Last Admin: 04/21/18 05:08 Dose:  Not Given











- Labs


Labs: 


 





 04/20/18 08:15 





 04/20/18 08:15 











Attending/Attestation





- Attestation


I have personally seen and examined this patient.: Yes


I have fully participated in the care of the patient.: Yes


I have reviewed all pertinent clinical information, including history, physical 

exam and plan: Yes


Notes (Text): 





04/21/18 11:26


Medical record note made by the resident after discussion with my direction and 

input after the patient was personally seen and examined by me. I have reviewed 

the chart and agree that the record accurately reflects by personal performance 

of the history, physical exam, data review, and medical decision-making, in the 

course for the patient. I have also personally directed the plan of care.





58  yrs old female  with PMHx HTN/HLD, R breast cancer in remission,

Nephrolithiasis  comes in for elective surgery with Dr. Lawrence due to L 

hydronephrosis due to a very large calculus at mid left ureter with chronic 

obstruction.Patient is SP  ureteroscopy with holmium laser lithrotripsy and 

cystoscopy with pigtail stent .


Patient is afebrile since  48 hors.Blood cultures  are negative for any 

growth.Patient is cleared to be discharged from medical point of view on PO 

Keflex for 7 days.


Management plan was discussed in detail with patient. Education was provided

## 2018-04-21 NOTE — PCM.URO
Urology Progress Note





- Objective


Lab Studies: Reviewed (gu dx: s/p cysto , left ureteroscopy, laser lithotripsy 

of giant ureteral  stone, currently with left stent and stone in residual 

smaller stone in left kidney.  Pt with one febrile event on thursday evening 

and requesting for discharge home.    gu plans:  will follow advice of various 

consultants.   gu plans for discharge as soon as pt is cleared .  perhaps we 

can arrange for outpt iv antibiotics.   gu plans are for outpt [facility] in [place] 

and then eventual removal of stent.  We will also order an outpt Renal scan , 

nuclear medicine to see how left kidnye is functioning now that obstruction is 

relieved.   will order magnesium citrate to assist with BM full note to be 

dictated  janey espinoza , [phone])


Lab Results Last 24 Hours: 


 Laboratory Results - last 24 hr











  04/20/18 04/20/18





  08:15 08:15


 


WBC  10.7 


 


RBC  3.58 


 


Hgb  10.2 L 


 


Hct  30.6 L 


 


MCV  85.5 


 


MCH  28.5 


 


MCHC  33.3 


 


RDW  13.6 


 


Plt Count  187 


 


MPV  8.9 


 


Gran %  78.3 H 


 


Lymph % (Auto)  15.8 L 


 


Mono % (Auto)  5.4 


 


Eos % (Auto)  0.2 L 


 


Baso % (Auto)  0.3 


 


Gran #  8.39 H 


 


Lymph # (Auto)  1.7 


 


Mono # (Auto)  0.6 


 


Eos # (Auto)  0.0 


 


Baso # (Auto)  0.03 


 


Sodium   143


 


Potassium   3.3 L


 


Chloride   106


 


Carbon Dioxide   22


 


Anion Gap   18


 


BUN   5 L


 


Creatinine   0.7


 


Est GFR ( Amer)   > 60


 


Est GFR (Non-Af Amer)   > 60


 


Random Glucose   149 H


 


Calcium   8.7


 


Total Bilirubin   0.7


 


AST   34


 


ALT   39


 


Alkaline Phosphatase   67


 


Total Protein   7.2


 


Albumin   3.9


 


Globulin   3.2


 


Albumin/Globulin Ratio   1.2











Intake & Output: 


 Intake & Output











 04/20/18 04/20/18 04/21/18





 06:59 18:59 06:59


 


Intake Total 960  720


 


Output Total 2675  1200


 


Balance -1715  480


 


Intake:   


 


  Oral 960  720


 


Output:   


 


  Urine 2675  1200


 


    Urethral (Andres) 2675  1200


 


Other:   


 


  # Voids   


 


    Urethral (Andres)   2


 


  # Bowel Movements 0  2











Vital Signs: 


 Vital Signs - 24 hr











  04/20/18 04/20/18 04/20/18





  07:44 08:44 14:00


 


Temperature 99.5 F 98.4 F 98.1 F


 


Pulse Rate   109 H


 


Respiratory   18





Rate   


 


Blood Pressure   133/80


 


O2 Sat by Pulse   100





Oximetry

## 2018-04-21 NOTE — PN
DATE:



ONCOLOGY PROGRESS NOTE:



LOCATION:  Patient is in room 566, bed 1.



SUBJECTIVE:  The patient is being discharged today.  She has been afebrile

for the last 24 hours.  Blood cultures have been negative.  This is a

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

status post mastectomy, being recently assessed for right breast

reconstruction, had a PET CT scan for re-ascertaining the status of the

disease.  During the workup, was found to have increasing and worsening

left hydronephrosis due to a large stone in the left mid ureter for which

she was referred to Dr. Lawrence for attempting to do a lithotripsy and

placing a stent in the left ureter which was done.  Right after the

procedure was done, the patient has to be admitted because of severe pain,

fevers, chills.  The patient was monitored for the last 48 hours, has been

gradually improving.  Pain has improved significantly, as initially the

pain was 10/10, has now regressed dramatically.  The patient has been

urinating.  Urine is dark in color.  No more blood clots.  The patient's

urine has been collected and filtered to check for urinary gravel for the

stones.  It appears on the x-rays, as per my discussion with Dr. Lawrence,

that part of the stone may have actually moved upward while being

manipulated and that may have to be addressed later on down the road.  As

for now, if it is not causing any symptoms, she is going to be monitored as

an outpatient.  The patient also has a history of abnormal findings on the 

PET CT scan for which she needs an MRI of the dorsal spine and that has not

yet been done as the stone and the left hydronephrosis _____ .



Objectively, the patient is feeling better, in no acute distress.  No

fevers, no chills.  No nausea, no vomiting.   Appetite is improved. 

Overall the patient feels better.



PHYSICAL EXAMINATION:

GENERAL:  Reveals the patient to be awake, alert and oriented, in no acute

distress.

VITAL SIGNS:  T-max is 98.4, pulse is 99, respirations 20, blood pressure

is 124/70, pulse ox is 100% on room air.  Stable.

HEENT:  Head is normocephalic, atraumatic.  Conjunctivae pale.  Sclerae is

anicteric.  Pupils are equally reactive to light and accommodation. 

Examination of the oropharynx reveals no oropharyngeal lesions.

CARDIOVASCULAR:  Reveals PMI to be in the fifth intercostal space, inside

the midclavicular line.  S1 and S2 are normal.  No gallop or murmur is

heard.

ABDOMEN:  Soft, nontender.  Bowel sounds are present.  The patient has

vague left quadrant tenderness.

EXTREMITIES:  Reveal no cyanosis, clubbing or edema, on examination of both

lower extremities.

BACK:  Reveals no CVA tenderness.  Spine is okay.

NEUROLOGIC:  Reveals higher functions to be normal.  No significant

deficits are noted.

SKIN:  Turgor is normal.  No skin lesions are noted.



ASSESSMENT, NOTES AND PLAN:  This is a 58-year-old female with history of

stage IV carcinoma of the breast status post chemotherapy, status post one

year of Herceptin, currently in the hospital after attempted laser therapy

for significantly large ureteric stone in the left side, part of which has

been removed.  The patient had some stent in the left side, part of the

stone is still there which has to be monitored and will be followed by Dr. Lawrence as an outpatient.  The patient has a pigtail stent on the left

side.  Although the patient will be monitored as an outpatient, she may

need additional testing with the MRI of the spine to ascertain the

abnormalities picked up on the PET CT appeared to be resolved at this point

in time.  The patient is being cleared for discharge by Urology.  Bladder

spasm appears to be improved.  The patient is going to continue her

medications at this point in time, on p.o. Keflex for 7 days, and she will

be followed up in our office as well as Dr. Lawrence.  _____ exam

instructions have been given to the patient.  The patient's potassium is

low which will be replaced as well.





__________________________________________

Diallo Barone MD



DD:  04/21/2018 16:11:04

DT:  04/21/2018 20:21:40

Job # [id number]

## 2018-04-21 NOTE — CP.PCM.PN
Subjective





- Date & Time of Evaluation


Date of Evaluation: 04/21/18


Time of Evaluation: 11:30





- Subjective


Subjective: 


Infectious Disease Follow Up:


April 21, 2018





57 yo [place] female with PMHx of HTN, hyperlipidemia, right breast cancer 

requiring mastectomy and radiation therapy with one episode of recurrence also 

treated presented for large calculus at mid left ureter with chronic 

obstruction.  The patient was taken to OR by Dr. Lawrence for laser therapy to 

remove and break stone.  Majority of the stone was removed.  A smaller piece in 

the left kidney at this time.  Had urinary retention as well.  Developed fevers 

up to 102.8 F today.





Overall she is feeling better but still with tenderness of the left mid back 

and flank regions.





Afebrile the past 48 hours.





Patient wants to go home today.





Objective





- Vital Signs/Intake and Output


Vital Signs (last 24 hours): 


 











Temp Pulse Resp BP Pulse Ox


 


 98.1 F   99 H  20   124/70   100 


 


 04/20/18 14:00  04/21/18 08:24  04/21/18 08:24  04/21/18 08:24  04/21/18 08:24








Intake and Output: 


 











 04/21/18 04/21/18





 06:59 18:59


 


Intake Total 720 


 


Output Total 2800 


 


Balance -2080 














- Labs


Labs: 


 





 04/20/18 08:15 





 04/20/18 08:15 











- Constitutional


Appears: Non-toxic, No Acute Distress, Chronically Ill





- Head Exam


Head Exam: ATRAUMATIC, NORMOCEPHALIC





- Eye Exam


Eye Exam: EOMI, PERRL


Pupil Exam: NORMAL ACCOMODATION, PERRL





- ENT Exam


ENT Exam: Mucous Membranes Moist, Normal External Ear Exam, TM's Normal 

Bilaterally





- Neck Exam


Neck Exam: Full ROM, Normal Inspection





- Respiratory Exam


Respiratory Exam: Clear to Ausculation Bilateral, NORMAL BREATHING PATTERN.  

absent: Rales, Rhonchi, Wheezes





- Cardiovascular Exam


Cardiovascular Exam: REGULAR RHYTHM, RRR, +S1, +S2





- GI/Abdominal Exam


GI & Abdominal Exam: Soft, Tenderness, Normal Bowel Sounds.  absent: Distended


Additional comments: 





left flank pain.





- Extremities Exam


Extremities Exam: Full ROM, Normal Inspection





- Neurological Exam


Neurological Exam: Alert, Awake, CN II-XII Intact, Oriented x3





- Psychiatric Exam


Psychiatric exam: Normal Affect, Normal Mood





- Skin


Skin Exam: Intact, Normal Color





Assessment and Plan





- Assessment and Plan (Free Text)


Assessment: 


57 yo [place] female with Breast Cancer treated with mastectomy and radiation 

therapy.  Laser therapy for very large urethral stone.  Patient with fevers up 

to 102.8 F.  Started on Cefepime for antibiotic coverage.  Pan cultures of 

blood and urine.  Supportive care.





Patient with good renal function and no leukocytosis at this time.





Afebrile today and actually for the past 48 hours.  May consider use of Keflex 

for outpatient treatment.  Cultures negative (blood and urine) to date.





Case discussed with Dr. Lawrence and Dr. Eugene.  Patient wants to go home.





Thank you for allowing me to participate in the care of the patient, we will 

follow with you.

## 2018-04-23 NOTE — HP
UROLOGY ADMISSION HISTORY AND PHYSICAL



REASON FOR ADMISSION:  Treatment of a kidney stone.



HISTORY OF PRESENT ILLNESS:  Ms. Rob is a very pleasant lady who has

underlying metastatic breast cancer, treated by Dr. Barone.  From a

Urology stand point, we are admitting the patient now.  She has a known

stone.  She has had this stone quite some time and she is now coming in to

treatment.  It is about a 1.5-cm stone in the ureter.



She is being admitted.  I spoke to the patient that given the size of the

stone, there is going to be a staged procedure.



If we can get a stent in today, _____ ureteroscope laser, but definitely,

with the size of the stone, also the massive amount of left hydronephrosis

in the left kidney, definitely it does not look like it functions alright. 

In fact, at some point, we are going to plan for a renal scan, want to

relieve the obstruction, but not yet.



PAST MEDICAL AND SURGICAL HISTORY:  As listed.  Otherwise, unremarkable.



SOCIAL HISTORY:  Essentially unremarkable.



REVIEW OF SYSTEMS:  As listed above, noncontributory.  No weight loss,

chest pain, shortness of breath or the like.



MEDICATIONS:  See chart.



SOCIAL HISTORY:  Unremarkable.



PHYSICAL EXAMINATION:

GENERAL:  A well-developed, well-nourished female, in no apparent distress.

She is younger than her stated age.

VITAL SIGNS:  Within normal limits and listed in the chart.

LUNGS:  Clear.

HEART:  Normal S1, S2.

ABDOMEN:  Overall soft.  Nontender.  No real flank masses appreciated.

PELVIC:  Reveals normal external genitalia, no pelvic or rectal masses.



DIAGNOSES:  Urolithiasis, hematuria, flank pain intermittently, metastatic

breast cancer.



ASSESSMENT AND PLAN:  This is a very pleasant lady.  It looks like the left

kidney does not work as well as the right.  The parenchyma is a little

weakened.  We can see all other images that are on the chart.



At this point, we are going to do our best effort to rip the body of the

stone.



The plan at this time is to bring the patient to the operating room,

antibiotic prophylaxis, and then further plans.  I explained to the patient

the overall plan is to make sure that left kidney is working well, but

first we got relieve the obstruction.



I guess, she has been waiting for her cancer issues to be somewhat

resolved.



We are going to make _____ today and then most likely the rest of the

treatment will be in the outpatient in the stone center.



I also discussed with the patient the risk of sepsis given the fact that

she is having a longstanding obstructed kidney.





__________________________________________

Jemal Lawrence MD



DD:  04/22/2018 20:37:57

DT:  04/23/2018 4:18:06

Job # [id number]

## 2018-04-23 NOTE — PN
DATE:



IMMEDIATE POSTOPERATIVE NOTE



See the preoperative and the operative report.



The patient is now going to be in a recovery room, stable vital signs

noted.



Status post stent for diagnosis for urolithiasis.



The plan is observation and then admission and then further plans will

follow.





__________________________________________

Jemal Lawrence MD



DD:  04/22/2018 20:44:44

DT:  04/23/2018 0:04:22

Job # [id number]

## 2018-04-23 NOTE — PN
DATE:



SUBJECTIVE:  See the note from 04/17/2018.  The patient is having some

general abdominal pain within normal limits postop.



See the operative note, we put a stent in yesterday.



Vital signs are within normal limits and no fever.



PHYSICAL EXAMINATION:  Essentially unremarkable.



DIAGNOSIS:  Severe pain.



PLAN:  As follows:  The patient is having severe pain postop.



A little more than it would be expected, I am also little worried about

sepsis given the fact that  _____ see the operating findings such as

obstructing kidney. The plan for now is as follows:

1.  Antibiotics.

2.  Analgesics because the patient is requiring tremendous amount of pain

medicine.

3.  We need to rule out sepsis.

4.  We will await medical oncology and various consults, but in the

meantime, the patient is to stay in the hospital given the fact that she is

having so much pain and then the other is we will plan for further imaging.





__________________________________________

Jemal Lawrence MD



DD:  04/22/2018 20:46:18

DT:  04/23/2018 0:01:09

Job # [id number]

## 2018-04-23 NOTE — PN
DATE:  04/20/2018



See note from 04/17/2018.



SUBJECTIVE:  The patient is now resting comfortably.  No _____.



See below plan.



PAST MEDICAL AND SURGICAL HISTORY:  No other changes.



PHYSICAL EXAMINATION:

GENERAL:  Well-nourished female, in no apparent distress..

VITAL SIGNS:  Actually within normal limits.



No further fevers.  Last fever was last night.



DIAGNOSES:  Urolithiasis, hematuria, hydronephrosis.



PLAN:  The plan is as follows, at some point, we are going to check the

left kidney, for function with the renal scan.  The patient is enthusiastic

as soon as she is allowed to be discharged.  We will talk to Dr. Hurt, the

Infectious Disease Doctor and Dr. Barone, her Medical Doctor and then make

further recommendations on plans, but most likely we will be able to

discharge her by 04/21.







__________________________________________

Jemal Lawrence MD



DD:  04/22/2018 20:48:27

DT:  04/23/2018 0:57:23

Job # [id number]

## 2018-04-23 NOTE — DS
UROLOGY DISCHARGE NOTE



HISTORY OF PRESENT ILLNESS:  See history and physical, operative report,

multiple progress notes.  At this point, the patient has requested

discharge with sending home.



Further plans will follow depending what we find clinically.



Further plans to follow.



I discussed with her she needs followup.  She has a stent.  She understands

fully well that she has a stent.  We are going to bring her out to the

stone center.



Then, further plans will follow.





__________________________________________

Jemal Lawrence MD





DD:  04/22/2018 20:50:01

DT:  04/23/2018 11:31:47

Job # [id number]

## 2018-04-23 NOTE — PN
DATE:  04/19/2018



SUBJECTIVE:  See the previous dictated notes from 04/17/2018 to 04/19/2018.

The patient has now actually a febrile episode one time.



See below.



The patient feels well.  She wants to know about discharge.



PHYSICAL EXAMINATION:

GENERAL:  Well nourished female, in no apparent distress.

VITAL SIGNS:  Noted.

ABDOMEN:  Relatively soft.

PELVIC:  Deferred.



DIAGNOSES:  Urolithiasis, hematuria, now she is febrile.



Status post a cystourethroscopy uncomplicated except that there was a

tremendously large stone and it required a lot of manipulation and we had

used antibiotic prophylaxis, but the patient is still having ongoing pain

and discomfort and having fever.



We are going to monitor closely slowly and not make major changes, but the

patient is to require to stay in the hospital until she is feeling better

and stronger.





__________________________________________

Jemal Lawrence MD



DD:  04/22/2018 20:47:33

DT:  04/23/2018 0:16:40

Job # [id number]

## 2018-04-23 NOTE — OP
PROCEDURE DATE:  04/17/2018



UROLOGY OPERATIVE REPORT



PREOPERATIVE DIAGNOSES:  Urolithiasis; hematuria; flank pain, intermittent

pain.



POSTOPERATIVE DIAGNOSES:  Urolithiasis, hematuria; flank pain, intermittent

pain.



PROCEDURES:  Examination under anesthesia, cystoscopy, left ureteral

dilation, left ureteroscopy, left laser lithotripsy, and insertion of left

double J-stent.



COMPLICATIONS:  None.



BLOOD LOSS:  Less than 25 mL.



FINDINGS:  Are as follows:

1.  The bladder mucosa is within normal limits.

2.  There is a gigantic stone between 1.5 to 2 cm in the left upper ureter

in the beginning of the case.

3.  At the termination procedure, that stone is completely gone.  There is

nothing in the ureter, but most likely some of it is up in the kidney.



FINDINGS:  Are as follows:

1.  The bladder mucosa is within relatively normal limits.

2.  There is a lot of hydronephrosis.

3.  There is an obstructing stone, tremendously large stone to the point

that I could not get anything passed on until we lasered and then

subsequently, we got the stent in place and there is nothing left in the

ureter.



INDICATIONS:  See the history and physical _____ the patient is here now

for the above procedure.



DESCRIPTION OF PROCEDURE:  After discussing the options with the patient,

we discussed risks, benefits, treatment and alternatives.  At this point,

we just wanted to place a stent except we could not even _____.  I

discussed with the patient that I will take a couple of staged procedures. 

The patient has been _____ much medically and therefore, I told we will do

as much as we can at one time.



_____ the patient was given antibiotic prophylaxis prior to beginning the

procedure.  Time-outs were called.  We confirmed the patient positioning. 

The patient was in the lithotomy position.  We introduced the cystoscope

via urethra.  The ureter was identified.  Retrograde pyelogram was

performed.   film shows a gigantic stone.  It was between 1.5 cm to 2

cm within the ureter and upper ureter.  At this point, we put a wire up to

the kidney, but we could not get it.  It just kept getting struck _____

stone level.  We tried carefully manipulating.  We used the open-ended wire

technique, we used the _____ nothing would go in.



So, at this point, I was very concerned for sepsis.  So, what we did is we

kept the wire to the level of the stone and the thinking was that if we

could not get a wire in, the patient is going to require percutaneous

nephrostolithotomy as a relatively emergency procedure.  Specifically, I

was concerned about sepsis once we started manipulating the system.  So, at

this point, with the wire up to the kidney, I then went with the

ureteroscope adjacent, now with a second wire through the ureteroscope and

going through dilating basically the distal ureter, but then going more

proximally, right at the stone level, I could see a little narrowing area

and I was able to slip the wire pass that stone and go up to the kidney. 

We confirmed our positioning.  Once we did this, we now had a safety wire. 

So, I took the scope back out, left the safety wire in, went back in again,

and then, began lasering the stone.



I still felt at the beginning, it was so impacted and so gigantic that even

with a wire up to the kidney _____ safety wire that would not been able to

get a double J stent in, but I did feel that we could work safely on this

stone where we provided holmium YAG laser, we started with about 1.8 joules

in a frequency of about 3 and just kept working diligently and carefully

until there was nothing in the ureter.  Some of the stone fragments

probably fragmented up to the ureter, up to the kidney, difficult to see

this with the contrast in the system, there is a lot of hydronephrosis, but

there is definitely nothing in the ureter.  I took multiple images and is

really nothing to see except on directly looking at it.



At this point, we now, therefore, a wide open _____ we put a double-J stent

in.  The double-J stent was placed to the kidney, to the bladder, we

confirmed positioning.  The patient tolerated the procedure well without

complications.



ADDENDUM:

The plan will be admission, antibiotics and then further plans will follow.

The patient already received the antibiotics; so, we will give extra.







__________________________________________

Jemal Lawrence MD



DD:  04/22/2018 20:42:48

DT:  04/23/2018 3:48:53

Job # [id number]

## 2018-04-23 NOTE — PN
DATE:  04/21/2018



I received a phone call this morning from the patient requesting discharge

home and so, I spoke to medical doctor who was interested that the patient

actually stay a little bit, but the patient states she really wants to be

discharged.  I discussed with her the difficult scenario.  After discussing

all the different option with the patient, we are now discharging home and

then further plans will follow.





__________________________________________

Jemal Lawrence MD



DD:  04/22/2018 20:49:06

DT:  04/23/2018 0:08:14

Job # [id number]

## 2018-04-23 NOTE — PN
DATE:  04/17/2018



IMMEDIATE POSTOPERATIVE NOTE



The patient has a double-J stent in good location.



The patient tolerated without complications.







__________________________________________

Jemal Lawrence MD



DD:  04/22/2018 20:44:06

DT:  04/22/2018 23:38:27

Job # [id number]

## 2018-04-24 NOTE — US
PROCEDURE:  Ultrasound of the Kidneys



HISTORY:

left cva pain



COMPARISON:

None available.



TECHNIQUE:

Sonogram of the kidneys.



FINDINGS:



RIGHT KIDNEY:

Measures: 10.5 cm. 



Normal in size, contour and echogenicity. 



No mass or hydronephrosis. Multiple small calculi. Mid pole, 2 

calculi measuring 3 mm and a 7 mm calculus. Upper pole, 3 mm 

calculus. 



LEFT KIDNEY:

Measures: 10.8 cm. 



Normal in size, contour and echogenicity. 



Mild hydronephrosis. Ureteral stent noted. Upper pole calculus, 18 

mm. 



OTHER FINDINGS:

None.



IMPRESSION:

Mild left hydronephrosis.  Left ureteral stent.  Bilateral renal 

calculi as described.

## 2019-02-28 ENCOUNTER — HOSPITAL ENCOUNTER (OUTPATIENT)
Dept: HOSPITAL 42 - SDS | Age: 59
Discharge: HOME | End: 2019-02-28
Attending: RADIOLOGY
Payer: COMMERCIAL

## 2019-02-28 VITALS — DIASTOLIC BLOOD PRESSURE: 70 MMHG | HEART RATE: 86 BPM | SYSTOLIC BLOOD PRESSURE: 110 MMHG

## 2019-02-28 VITALS — BODY MASS INDEX: 21.9 KG/M2

## 2019-02-28 VITALS — TEMPERATURE: 98 F

## 2019-02-28 VITALS — RESPIRATION RATE: 18 BRPM

## 2019-02-28 VITALS — OXYGEN SATURATION: 97 %

## 2019-02-28 DIAGNOSIS — Z90.12: ICD-10-CM

## 2019-02-28 DIAGNOSIS — F41.9: ICD-10-CM

## 2019-02-28 DIAGNOSIS — C50.919: ICD-10-CM

## 2019-02-28 DIAGNOSIS — E78.5: ICD-10-CM

## 2019-02-28 DIAGNOSIS — Z45.2: Primary | ICD-10-CM

## 2019-02-28 DIAGNOSIS — I10: ICD-10-CM

## 2019-02-28 LAB
APTT BLD: 37.3 SECONDS (ref 26.9–38.3)
BASOPHILS # BLD AUTO: 0.03 K/MM3 (ref 0–2)
BASOPHILS NFR BLD: 0.7 % (ref 0–3)
BUN SERPL-MCNC: 13 MG/DL (ref 7–21)
CALCIUM SERPL-MCNC: 9.6 MG/DL (ref 8.4–10.5)
EOSINOPHIL # BLD: 0 10*3/UL (ref 0–0.7)
EOSINOPHIL NFR BLD: 0.9 % (ref 1.5–5)
ERYTHROCYTE [DISTWIDTH] IN BLOOD BY AUTOMATED COUNT: 12.9 % (ref 11.5–14.5)
GFR NON-AFRICAN AMERICAN: > 60
HGB BLD-MCNC: 13.6 G/DL (ref 12–16)
INR PPP: 0.97
LYMPHOCYTES # BLD: 1.6 10*3/UL (ref 1.2–3.4)
LYMPHOCYTES NFR BLD AUTO: 37.4 % (ref 22–35)
MCH RBC QN AUTO: 30 PG (ref 25–35)
MCHC RBC AUTO-ENTMCNC: 33.1 G/DL (ref 31–37)
MCV RBC AUTO: 90.5 FL (ref 80–105)
MONOCYTES # BLD AUTO: 0.3 10*3/UL (ref 0.1–0.6)
MONOCYTES NFR BLD: 7.2 % (ref 1–6)
PLATELET # BLD: 220 10^3/UL (ref 120–450)
PMV BLD AUTO: 9.2 FL (ref 7–11)
PROTHROMBIN TIME: 11 SECONDS (ref 9.4–12.5)
RBC # BLD AUTO: 4.54 10^6/UL (ref 3.5–6.1)
WBC # BLD AUTO: 4.3 10^3/UL (ref 4.5–11)

## 2019-02-28 PROCEDURE — 36590 REMOVAL TUNNELED CV CATH: CPT

## 2019-02-28 PROCEDURE — 80048 BASIC METABOLIC PNL TOTAL CA: CPT

## 2019-02-28 PROCEDURE — 85610 PROTHROMBIN TIME: CPT

## 2019-02-28 PROCEDURE — 99152 MOD SED SAME PHYS/QHP 5/>YRS: CPT

## 2019-02-28 PROCEDURE — 36415 COLL VENOUS BLD VENIPUNCTURE: CPT

## 2019-02-28 PROCEDURE — 85025 COMPLETE CBC W/AUTO DIFF WBC: CPT

## 2019-02-28 PROCEDURE — 85730 THROMBOPLASTIN TIME PARTIAL: CPT

## 2019-02-28 NOTE — VASCULAR
PROCEDURE:  Removal of tunneled left internal jugular venous access 

port.



CLINICAL HISTORY:  Metastatic breast carcinoma.  Completed 

chemotherapy.  Remove port. 



PHYSICIAN(S):  Mert Thomas M.D.



TECHNIQUE:

The relative risks and indications of the procedure were explained to 

the patient and consent obtained. The patient was placed supine on 

the arteriogram table and the left neck and chest prepped and draped 

usual sterile fashion. Conscious sedation and monitoring were 

provided throughout the procedure by a nurse. Antibiotics were given 

prior to the procedure. 1% Xylocaine was used to anesthetize the skin 

and soft tissues at the port. A 4 cm incision was made. The port was 

bluntly dissected and removed. The catheter was removed under 

fluoroscopic guidance. No retained catheter fragments were seen. The 

pocket was lavaged with normal saline. The pocket was closed in 2 

layers. The patient tolerated the procedure well.



IMPRESSION:

1. Removal of tunneled left internal jugular venous access port.